# Patient Record
Sex: MALE | Race: WHITE | NOT HISPANIC OR LATINO | Employment: OTHER | URBAN - METROPOLITAN AREA
[De-identification: names, ages, dates, MRNs, and addresses within clinical notes are randomized per-mention and may not be internally consistent; named-entity substitution may affect disease eponyms.]

---

## 2023-03-28 ENCOUNTER — HOSPITAL ENCOUNTER (OUTPATIENT)
Dept: DATA CONVERSION | Facility: HOSPITAL | Age: 63
End: 2023-03-28
Attending: RADIOLOGY | Admitting: RADIOLOGY

## 2023-03-28 DIAGNOSIS — M89.9 DISORDER OF BONE, UNSPECIFIED: ICD-10-CM

## 2023-03-28 DIAGNOSIS — R59.0 LOCALIZED ENLARGED LYMPH NODES: ICD-10-CM

## 2023-03-28 LAB
ERYTHROCYTE DISTRIBUTION WIDTH (RATIO) BY AUTOMATED COUNT: 14.2 % (ref 11.5–14.5)
ERYTHROCYTE MEAN CORPUSCULAR HEMOGLOBIN CONCENTRATION (G/DL) BY AUTOMATED: 32 G/DL (ref 32–36)
ERYTHROCYTE MEAN CORPUSCULAR VOLUME (FL) BY AUTOMATED COUNT: 85 FL (ref 80–100)
ERYTHROCYTES (10*6/UL) IN BLOOD BY AUTOMATED COUNT: 5.07 X10E12/L (ref 4.5–5.9)
HEMATOCRIT (%) IN BLOOD BY AUTOMATED COUNT: 43.1 % (ref 41–52)
HEMOGLOBIN (G/DL) IN BLOOD: 13.8 G/DL (ref 13.5–17.5)
INR IN PPP BY COAGULATION ASSAY: 1 (ref 0.9–1.1)
LEUKOCYTES (10*3/UL) IN BLOOD BY AUTOMATED COUNT: 5.7 X10E9/L (ref 4.4–11.3)
NRBC (PER 100 WBCS) BY AUTOMATED COUNT: 0 /100 WBC (ref 0–0)
PLATELETS (10*3/UL) IN BLOOD AUTOMATED COUNT: 221 X10E9/L (ref 150–450)
PROTHROMBIN TIME (PT) IN PPP BY COAGULATION ASSAY: 11.2 SEC (ref 9.8–13.4)

## 2023-03-29 LAB
COMPLETE PATHOLOGY REPORT: NORMAL
CONVERTED CLINICAL DIAGNOSIS-HISTORY: NORMAL
CONVERTED DIAGNOSIS COMMENT: NORMAL
CONVERTED FINAL DIAGNOSIS: NORMAL
CONVERTED FINAL REPORT PDF LINK TO COPY AND PASTE: NORMAL
CONVERTED SPECIMEN DESCRIPTION: NORMAL

## 2023-09-07 VITALS — HEIGHT: 72 IN | BODY MASS INDEX: 33.23 KG/M2 | WEIGHT: 245.37 LBS

## 2023-10-04 ENCOUNTER — LAB REQUISITION (OUTPATIENT)
Dept: LAB | Facility: HOSPITAL | Age: 63
End: 2023-10-04

## 2023-10-05 ENCOUNTER — LAB REQUISITION (OUTPATIENT)
Dept: LAB | Facility: HOSPITAL | Age: 63
End: 2023-10-05
Payer: MEDICAID

## 2023-10-05 PROCEDURE — 88321 CONSLTJ&REPRT SLD PREP ELSWR: CPT | Performed by: PATHOLOGY

## 2023-10-05 PROCEDURE — 88321 CONSLTJ&REPRT SLD PREP ELSWR: CPT

## 2023-10-19 LAB
LAB AP ASR DISCLAIMER: NORMAL
PATH REPORT.FINAL DX SPEC: NORMAL
PATH REPORT.GROSS SPEC: NORMAL
PATH REPORT.MICROSCOPIC SPEC OTHER STN: NORMAL
PATH REPORT.RELEVANT HX SPEC: NORMAL
PATH REPORT.TOTAL CANCER: NORMAL

## 2024-05-09 ENCOUNTER — TUMOR BOARD CONFERENCE (OUTPATIENT)
Dept: HEMATOLOGY/ONCOLOGY | Facility: HOSPITAL | Age: 64
End: 2024-05-09
Payer: MEDICAID

## 2024-05-09 NOTE — TUMOR BOARD NOTE
TUMOR BOARD DISCUSSION SUMMARY    PRESENTER: Dr. Shelley Jay    DIAGNOSIS: ***    SUMMARY/PRESENTATION: Ariel Pink is a 64 y.o. male patient who presents with      History: ***    Previous treatment: ***    Information reviewed: {MDT Conference Review Type:43784}    Radiology: ***    Pathology: ***    STAGE (if available): ***    RECOMMENDATIONS: ***    CLINICAL TRIALS: ***      Disclaimer     SCC tumor board recommendations represent the consensus opinion of physicians present at a weekly patient care conference. The treating SCC physician is not always present, and many of the physicians formulating the recommendation have not personally seen or examined the patient under discussion. It is understood that the treating SCC physician considers the expertise of the Tumor Board Recommendation in formulating his/her plan for the patient. However, in many situations, based on individualized patient considerations, a different plan is determined by the treating physician to be the optimal medical management.     Scribe Attestation  By signing my name below, Elsa TURNER Scribe   attest that this documentation has been prepared under the direction and in the presence of MALIGNANT HEME TUMOR BOARD.

## 2024-05-14 ENCOUNTER — DOCUMENTATION (OUTPATIENT)
Dept: HEMATOLOGY/ONCOLOGY | Facility: HOSPITAL | Age: 64
End: 2024-05-14
Payer: MEDICAID

## 2024-05-14 NOTE — PROGRESS NOTES
5/14/24 1345  Patient is referred by Dr. Jay for DLBCL for which he is on PolaR+CHP x6 and upon completion, has residual uptake in left parotid and perirectal area on PET. He saw ENT and was to have parotid bx on 5/8/24. Awaiting results of bx. Patient is being presented at  on Thursday and I have a call out to him to schedule with [facility] for tx recs if refractory disease. CANDIDA Carmona    5/16/24 2750  Spoke with patient's wife yesterday. We thought patient was being presented at  this morning but he did not end up being presented. I have a call out to patient. I attempted to reach wife but she does not have VM set up. CANDIDA Carmona

## 2024-05-16 ENCOUNTER — TUMOR BOARD CONFERENCE (OUTPATIENT)
Dept: HEMATOLOGY/ONCOLOGY | Facility: HOSPITAL | Age: 64
End: 2024-05-16
Payer: MEDICAID

## 2024-05-16 NOTE — TUMOR BOARD NOTE
TUMOR BOARD DISCUSSION SUMMARY    PRESENTER: Dr. Shelley Jay    DIAGNOSIS: Diffuse Large B cell lymphoma.     SUMMARY/PRESENTATION: Ariel Pink is a 64 y.o. male patient who presents with diffuse large B-cell lymphoma, s/p 6 cycles RCHP and Polatuzumab, 2 cycles rituxan. Now with uptake in perirectal area.        Previous treatment: RCHP and Polatuzumab     Information reviewed: Radiology Review    Radiology: (05/01/24) PET CT: Persistent lymph node near the left parotid gland with interval decrease in FDG uptake. No other FDG avid adenopathy. Indeterminate focus of increased uptake at the rectum on the right.       RECOMMENDATIONS: Further assessment of rectal mass.              Disclaimer     SCC tumor board recommendations represent the consensus opinion of physicians present at a weekly patient care conference. The treating SCC physician is not always present, and many of the physicians formulating the recommendation have not personally seen or examined the patient under discussion. It is understood that the treating SCC physician considers the expertise of the Tumor Board Recommendation in formulating his/her plan for the patient. However, in many situations, based on individualized patient considerations, a different plan is determined by the treating physician to be the optimal medical management.     Scribe Attestation  By signing my name below, Elsa TURNER Scribe   attest that this documentation has been prepared under the direction and in the presence of MALIGNANT HEME TUMOR BOARD.

## 2024-05-17 DIAGNOSIS — C83.30 DIFFUSE LARGE B-CELL LYMPHOMA, UNSPECIFIED BODY REGION (MULTI): ICD-10-CM

## 2024-05-23 ENCOUNTER — OFFICE VISIT (OUTPATIENT)
Dept: HEMATOLOGY/ONCOLOGY | Facility: HOSPITAL | Age: 64
End: 2024-05-23
Payer: MEDICAID

## 2024-05-23 VITALS
OXYGEN SATURATION: 97 % | TEMPERATURE: 97 F | SYSTOLIC BLOOD PRESSURE: 131 MMHG | BODY MASS INDEX: 29.44 KG/M2 | WEIGHT: 210.32 LBS | HEART RATE: 64 BPM | DIASTOLIC BLOOD PRESSURE: 88 MMHG | HEIGHT: 71 IN | RESPIRATION RATE: 18 BRPM

## 2024-05-23 DIAGNOSIS — C83.30 DIFFUSE LARGE B-CELL LYMPHOMA, UNSPECIFIED BODY REGION (MULTI): ICD-10-CM

## 2024-05-23 PROCEDURE — 99215 OFFICE O/P EST HI 40 MIN: CPT | Performed by: INTERNAL MEDICINE

## 2024-05-23 PROCEDURE — 99205 OFFICE O/P NEW HI 60 MIN: CPT | Performed by: INTERNAL MEDICINE

## 2024-05-23 RX ORDER — GABAPENTIN 300 MG/1
300 CAPSULE ORAL 3 TIMES DAILY
COMMUNITY

## 2024-05-23 RX ORDER — HYDROCODONE BITARTRATE AND ACETAMINOPHEN 10; 300 MG/1; MG/1
1 TABLET ORAL EVERY 6 HOURS PRN
COMMUNITY

## 2024-05-23 RX ORDER — AMLODIPINE BESYLATE AND ATORVASTATIN CALCIUM 5; 10 MG/1; MG/1
1 TABLET, FILM COATED ORAL DAILY
COMMUNITY

## 2024-05-23 ASSESSMENT — PAIN SCALES - GENERAL: PAINLEVEL: 8

## 2024-06-04 ASSESSMENT — ENCOUNTER SYMPTOMS
GASTROINTESTINAL NEGATIVE: 1
NUMBNESS: 1
ENDOCRINE NEGATIVE: 1
CARDIOVASCULAR NEGATIVE: 1
HEMATOLOGIC/LYMPHATIC NEGATIVE: 1
EYES NEGATIVE: 1
CONSTITUTIONAL NEGATIVE: 1
RESPIRATORY NEGATIVE: 1
BACK PAIN: 1
PSYCHIATRIC NEGATIVE: 1

## 2024-06-04 NOTE — PROGRESS NOTES
Patient ID: Ariel Pink is a 64 y.o. male.  Referring Physician: Shelley Jay MD  [address] [id number]  Primary Care Provider: Bib Castillo MD      Subjective    The patient is currently under the care of Dr. Shelley Jay of [facility] for recently diagnosed DLBCL in 2024.     Regarding DLBCL, he initially presented with left sided pleuritic chest pain in 10/2022, resulting in incidental finding of RPLAD and L5 lytic lesion. In Nov 2022 PET/CT showed multiple mildly-moderately FDG avid LAD. More work up was done, including IR bx of a RPLN in 3/2023, but not diagnostic of a cancer. He presented with new worsening back pain in 8/2023, followed by fall and pathological fractures of the spine. During surgery on 8/11/2023 for fusion of T12-L1, L3-L4, biopsy was also performed leading to DLBCL, with positive BCL6 and MUM1 staining, and non-GCB subtype. KI67 was 70-80%, and the specimen was not useful for FISH. His IPI was 3 (age, extranodal sites, high LDH). He was therefore given DEBORAH-R-CHP (10/18/2023--) in addition to palliative XRT of the involved spine area. After 3 cycles he was found to have achieved a CMR by PET/CT. Of note, the left parotid gland was mildly FDG avid and biopsy proven Warthin tumor. Unfortunately, he also had a recurrent PE and was on Eliquis. He then received 3 more deborah-RCHP, with c6 completed on 2/1/2024. After c5 his PET/CT again confirmed CMR of the ruddy and extranodal lymphoma. Again, the left parotid gland was mildly FDG avid and biopsy proven Warthin tumor.     On May 1, 2024, another PET/CT was repeated, showing LN of the left neck with SUV 4.8, and rectal wall uptake of SUV 7. These results raise the question of relapse. He is referred for further management.     Today he has chronic back pain, chronic, and chronic numbness in the back and thigh. No fever wt loss night sweats. No rectal bleeding or pain.           Review of  "Systems   Constitutional: Negative.    HENT:  Negative.     Eyes: Negative.    Respiratory: Negative.     Cardiovascular: Negative.    Gastrointestinal: Negative.    Endocrine: Negative.    Genitourinary: Negative.     Musculoskeletal:  Positive for back pain.   Skin: Negative.    Neurological:  Positive for numbness.   Hematological: Negative.    Psychiatric/Behavioral: Negative.          Objective   BSA: 2.19 meters squared  /88 (BP Location: Right arm, Patient Position: Sitting)   Pulse 64   Temp 36.1 °C (97 °F) (Temporal)   Resp 18   Ht (S) 1.81 m (5' 11.26\")   Wt 95.4 kg (210 lb 5.1 oz)   SpO2 97%   BMI 29.12 kg/m²     No family history on file.  Oncology History    No history exists.       Ariel Pink  reports that he has quit smoking. His smoking use included cigarettes. He started smoking about 49 years ago. He has a 70.5 pack-year smoking history. He has never used smokeless tobacco.  He  reports no history of alcohol use.  He  reports no history of drug use.    Physical Exam  Musculoskeletal:      Comments: 30cm surgical scar in the lower back.          Performance Status:  Asymptomatic    Assessment/Plan    #DLBCL  -Initial dx 8/11/2023. Non-GCB.   -Reviewed all results from OSH. Results are consistent with stage IV (ruddy plus extranodal-bone involvement), with an IPI score of 4 (age, stage, extranodal sites, high LDH).   -Misha-RCHP x 3 resulted in CMR. Misha-R-CHP x 5 again confirmed CMR.   -EOT (Misha-R-CHP X 6, Ritux x 2) PET/CT in May 2024: new FDG avid signals are reported: There is redemonstration of a lymph node with increased FDG uptake at the left upper neck superficial to the sternocleidomastoid along the posterior inferior margin of the parotid gland. The current standard uptake value is 4.8 previously 8.2. There is a focal area of increased uptake along the wall of the rectum on the right with SUV of 7.  -The patient has no symptoms related to the above 2 sites of FDG avid " signals. It is premature to consider those as lymphoma relapse. Colonoscopy or sigmoidoscopy is recommended to evaluate the rectum within the next 3-4 weeks, and repeat CT neck q6 w x 2 may also be considered. If the above studies do not raise further suspicion or evidence of relapse, he can be followed with imaging q6 mon. Due to extranodal involvement, PET/CT is preferred.   -If relapse is confirmed in the future there are many treatment options. The patient is reassured.   -One interesting aspect of his disease: pre-chemo PET/CT in 9/2023 reported only low SUV of the lymph nodes and bony lesions in different regions. Such low SUV is more typically seen in indolent lymphomas.     Plan:  -Colonoscopy or sigmoidoscopy is recommended to evaluate the rectum within the next 3-4 weeks.   -Repeat CT neck q6 w x 2 may also be considered.   -If the above studies do not raise further suspicion or evidence of relapse, he can be followed with imaging q6 mon. Due to extranodal involvement, PET/CT is preferred.   -[facility] He will be welcome to return for further discussion if needed.     Time spent > 65 min, with more than 50% on counseling and coordinating care.             Jose J Chang MD PhD

## 2024-06-04 NOTE — H&P (VIEW-ONLY)
Patient ID: Ariel Pink is a 64 y.o. male.  Referring Physician: Shelley Jay MD  [address] [phone]  Primary Care Provider: Bib Castillo MD      Subjective    The patient is currently under the care of Dr. Shelley Jay of [facility] for recently diagnosed DLBCL in 2024.     Regarding DLBCL, he initially presented with left sided pleuritic chest pain in 10/2022, resulting in incidental finding of RPLAD and L5 lytic lesion. In Nov 2022 PET/CT showed multiple mildly-moderately FDG avid LAD. More work up was done, including IR bx of a RPLN in 3/2023, but not diagnostic of a cancer. He presented with new worsening back pain in 8/2023, followed by fall and pathological fractures of the spine. During surgery on 8/11/2023 for fusion of T12-L1, L3-L4, biopsy was also performed leading to DLBCL, with positive BCL6 and MUM1 staining, and non-GCB subtype. KI67 was 70-80%, and the specimen was not useful for FISH. His IPI was 3 (age, extranodal sites, high LDH). He was therefore given DEBORAH-R-CHP (10/18/2023--) in addition to palliative XRT of the involved spine area. After 3 cycles he was found to have achieved a CMR by PET/CT. Of note, the left parotid gland was mildly FDG avid and biopsy proven Warthin tumor. Unfortunately, he also had a recurrent PE and was on Eliquis. He then received 3 more deborah-RCHP, with c6 completed on 2/1/2024. After c5 his PET/CT again confirmed CMR of the ruddy and extranodal lymphoma. Again, the left parotid gland was mildly FDG avid and biopsy proven Warthin tumor.     On May 1, 2024, another PET/CT was repeated, showing LN of the left neck with SUV 4.8, and rectal wall uptake of SUV 7. These results raise the question of relapse. He is referred for further management.     Today he has chronic back pain, chronic, and chronic numbness in the back and thigh. No fever wt loss night sweats. No rectal bleeding or pain.           Review of  "Systems   Constitutional: Negative.    HENT:  Negative.     Eyes: Negative.    Respiratory: Negative.     Cardiovascular: Negative.    Gastrointestinal: Negative.    Endocrine: Negative.    Genitourinary: Negative.     Musculoskeletal:  Positive for back pain.   Skin: Negative.    Neurological:  Positive for numbness.   Hematological: Negative.    Psychiatric/Behavioral: Negative.          Objective   BSA: 2.19 meters squared  /88 (BP Location: Right arm, Patient Position: Sitting)   Pulse 64   Temp 36.1 °C (97 °F) (Temporal)   Resp 18   Ht (S) 1.81 m (5' 11.26\")   Wt 95.4 kg (210 lb 5.1 oz)   SpO2 97%   BMI 29.12 kg/m²     No family history on file.  Oncology History    No history exists.       Ariel Pink  reports that he has quit smoking. His smoking use included cigarettes. He started smoking about 49 years ago. He has a 70.5 pack-year smoking history. He has never used smokeless tobacco.  He  reports no history of alcohol use.  He  reports no history of drug use.    Physical Exam  Musculoskeletal:      Comments: 30cm surgical scar in the lower back.          Performance Status:  Asymptomatic    Assessment/Plan    #DLBCL  -Initial dx 8/11/2023. Non-GCB.   -Reviewed all results from OSH. Results are consistent with stage IV (ruddy plus extranodal-bone involvement), with an IPI score of 4 (age, stage, extranodal sites, high LDH).   -Misha-RCHP x 3 resulted in CMR. Misha-R-CHP x 5 again confirmed CMR.   -EOT (Misha-R-CHP X 6, Ritux x 2) PET/CT in May 2024: new FDG avid signals are reported: There is redemonstration of a lymph node with increased FDG uptake at the left upper neck superficial to the sternocleidomastoid along the posterior inferior margin of the parotid gland. The current standard uptake value is 4.8 previously 8.2. There is a focal area of increased uptake along the wall of the rectum on the right with SUV of 7.  -The patient has no symptoms related to the above 2 sites of FDG avid " signals. It is premature to consider those as lymphoma relapse. Colonoscopy or sigmoidoscopy is recommended to evaluate the rectum within the next 3-4 weeks, and repeat CT neck q6 w x 2 may also be considered. If the above studies do not raise further suspicion or evidence of relapse, he can be followed with imaging q6 mon. Due to extranodal involvement, PET/CT is preferred.   -If relapse is confirmed in the future there are many treatment options. The patient is reassured.   -One interesting aspect of his disease: pre-chemo PET/CT in 9/2023 reported only low SUV of the lymph nodes and bony lesions in different regions. Such low SUV is more typically seen in indolent lymphomas.     Plan:  -Colonoscopy or sigmoidoscopy is recommended to evaluate the rectum within the next 3-4 weeks.   -Repeat CT neck q6 w x 2 may also be considered.   -If the above studies do not raise further suspicion or evidence of relapse, he can be followed with imaging q6 mon. Due to extranodal involvement, PET/CT is preferred.   -Patient will follow up with Dr. Jay of [facility]. He will be welcome to return for further discussion if needed.     Time spent > 65 min, with more than 50% on counseling and coordinating care.             Jose J Chang MD PhD

## 2024-06-11 ENCOUNTER — DOCUMENTATION (OUTPATIENT)
Dept: GASTROENTEROLOGY | Facility: HOSPITAL | Age: 64
End: 2024-06-11
Payer: MEDICAID

## 2024-06-11 NOTE — PROGRESS NOTES
Dr. Bonds's office received a referral from Dr. Shelley Jay for this patient to undergo a colonoscopy or sigmoidoscopy, recommended to evaluate the rectum  Dr. Bonds reviewed the referral on 6/11/2024. Per Dr. Bonds, OK to schedule colonoscopy.    Gi Bailon was notified to call and schedule this patient. Contact Yoli Sewell RN at [phone] for any questions.      See below for supporting clinical information from the referral sent by Dr. Jay's office and from medical records:    -Problem(s): Diffuse large B-cell lymphoma of extranodal site    -5/9/2024: Due to residual abnormalities on PET/CT, the patient refers to referral to  Malignant Hematology team for consideration of CAR-T therapy. Set up consultation with Dr. Cnidy Santiago malignant hematology in [place] for CAR-T evaluation. If sampling of a perirectal mass is needed this would likely have to occur at a tertiary center under endoscopic ultrasound    5/2/2024: PET/CT imaging and reports that show no residual uptake in the lumbar spine, but there are persistent areas of FDG avidity in the left parotid gland and perirectal area. Recommended presenting his case to  malignant hematology tumor board next week for recommendations (possible repeat colonoscopy/biopsy, salvage chemotherapy +/- autologous transplant evaluation).       Jose J Chang MD PhD  Hematology and Oncology Diffuse large B-cell lymphoma, unspecified body region (Multi)  Dx Referred by Shelley Jay MD  Reason for Visit     Progress Notes  Jose J Chang MD PhD (Physician)  Oncology  Expand All Collapse All  Patient ID: Ariel Pink is a 64 y.o. male.  Referring Physician: Shelley Jay MD  [address] [place] [id number]  Primary Care Provider: Bib Castillo MD         Subjective   The patient is currently under the care of Dr. Shelley Jay of [facility] for recently diagnosed DLBCL in 2024.      Regarding DLBCL, he  initially presented with left sided pleuritic chest pain in 10/2022, resulting in incidental finding of RPLAD and L5 lytic lesion. In Nov 2022 PET/CT showed multiple mildly-moderately FDG avid LAD. More work up was done, including IR bx of a RPLN in 3/2023, but not diagnostic of a cancer. He presented with new worsening back pain in 8/2023, followed by fall and pathological fractures of the spine. During surgery on 8/11/2023 for fusion of T12-L1, L3-L4, biopsy was also performed leading to DLBCL, with positive BCL6 and MUM1 staining, and non-GCB subtype. KI67 was 70-80%, and the specimen was not useful for FISH. His IPI was 3 (age, extranodal sites, high LDH). He was therefore given DANTE-R-CHP (10/18/2023--) in addition to palliative XRT of the involved spine area. After 3 cycles he was found to have achieved a CMR by PET/CT. Of note, the left parotid gland was mildly FDG avid and biopsy proven Warthin tumor. Unfortunately, he also had a recurrent PE and was on Eliquis. He then received 3 more dante-RCHP, with c6 completed on 2/1/2024. After c5 his PET/CT again confirmed CMR of the ruddy and extranodal lymphoma. Again, the left parotid gland was mildly FDG avid and biopsy proven Warthin tumor.      On May 1, 2024, another PET/CT was repeated, showing LN of the left neck with SUV 4.8, and rectal wall uptake of SUV 7. These results raise the question of relapse. He is referred for further management.      Today he has chronic back pain, chronic, and chronic numbness in the back and thigh. No fever wt loss night sweats. No rectal bleeding or pain.        Ariel Pink  reports that he has quit smoking. His smoking use included cigarettes. He started smoking about 49 years ago. He has a 70.5 pack-year smoking history. He has never used smokeless tobacco.  He  reports no history of alcohol use.  He  reports no history of drug use.       Assessment/Plan   #DLBCL  -Initial dx 8/11/2023. Non-GCB.   -Reviewed all results  from OS. Results are consistent with stage IV (ruddy plus extranodal-bone involvement), with an IPI score of 4 (age, stage, extranodal sites, high LDH).   -Misha-RCHP x 3 resulted in CMR. Misha-R-CHP x 5 again confirmed CMR.   -EOT (Misha-R-CHP X 6, Ritux x 2) PET/CT in May 2024: new FDG avid signals are reported: There is redemonstration of a lymph node with increased FDG uptake at the left upper neck superficial to the sternocleidomastoid along the posterior inferior margin of the parotid gland. The current standard uptake value is 4.8 previously 8.2. There is a focal area of increased uptake along the wall of the rectum on the right with SUV of 7.  -The patient has no symptoms related to the above 2 sites of FDG avid signals. It is premature to consider those as lymphoma relapse. Colonoscopy or sigmoidoscopy is recommended to evaluate the rectum within the next 3-4 weeks, and repeat CT neck q6 w x 2 may also be considered. If the above studies do not raise further suspicion or evidence of relapse, he can be followed with imaging q6 mon. Due to extranodal involvement, PET/CT is preferred.   -If relapse is confirmed in the future there are many treatment options. The patient is reassured.   -One interesting aspect of his disease: pre-chemo PET/CT in 9/2023 reported only low SUV of the lymph nodes and bony lesions in different regions. Such low SUV is more typically seen in indolent lymphomas.      Plan:  -Colonoscopy or sigmoidoscopy is recommended to evaluate the rectum within the next 3-4 weeks.   -Repeat CT neck q6 w x 2 may also be considered.   -If the above studies do not raise further suspicion or evidence of relapse, he can be followed with imaging q6 mon. Due to extranodal involvement, PET/CT is preferred.   -Patient will follow up with Dr. Jay of [facility]. He will be welcome to return for further discussion if needed.

## 2024-06-19 ENCOUNTER — HOSPITAL ENCOUNTER (OUTPATIENT)
Dept: GASTROENTEROLOGY | Facility: HOSPITAL | Age: 64
Setting detail: OUTPATIENT SURGERY
Discharge: HOME | End: 2024-06-19
Payer: MEDICAID

## 2024-06-19 ENCOUNTER — ANESTHESIA EVENT (OUTPATIENT)
Dept: GASTROENTEROLOGY | Facility: HOSPITAL | Age: 64
End: 2024-06-19
Payer: MEDICAID

## 2024-06-19 ENCOUNTER — ANESTHESIA (OUTPATIENT)
Dept: GASTROENTEROLOGY | Facility: HOSPITAL | Age: 64
End: 2024-06-19
Payer: MEDICAID

## 2024-06-19 VITALS
OXYGEN SATURATION: 96 % | SYSTOLIC BLOOD PRESSURE: 124 MMHG | RESPIRATION RATE: 16 BRPM | HEIGHT: 72 IN | HEART RATE: 96 BPM | BODY MASS INDEX: 28.31 KG/M2 | WEIGHT: 209 LBS | DIASTOLIC BLOOD PRESSURE: 68 MMHG | TEMPERATURE: 96.8 F

## 2024-06-19 DIAGNOSIS — C83.39 DIFFUSE LARGE B-CELL LYMPHOMA OF EXTRANODAL SITE (MULTI): Primary | ICD-10-CM

## 2024-06-19 DIAGNOSIS — C85.90 LYMPHOMA (MULTI): ICD-10-CM

## 2024-06-19 DIAGNOSIS — R93.89 ABNORMAL CT SCAN: ICD-10-CM

## 2024-06-19 DIAGNOSIS — R94.8 ABNORMAL POSITRON EMISSION TOMOGRAPHY (PET) SCAN: ICD-10-CM

## 2024-06-19 PROCEDURE — 7100000010 HC PHASE TWO TIME - EACH INCREMENTAL 1 MINUTE

## 2024-06-19 PROCEDURE — 76975 GI ENDOSCOPIC ULTRASOUND: CPT | Performed by: INTERNAL MEDICINE

## 2024-06-19 PROCEDURE — 45390 COLONOSCOPY W/RESECTION: CPT | Performed by: INTERNAL MEDICINE

## 2024-06-19 PROCEDURE — 2500000004 HC RX 250 GENERAL PHARMACY W/ HCPCS (ALT 636 FOR OP/ED): Performed by: INTERNAL MEDICINE

## 2024-06-19 PROCEDURE — 3700000001 HC GENERAL ANESTHESIA TIME - INITIAL BASE CHARGE

## 2024-06-19 PROCEDURE — 2500000004 HC RX 250 GENERAL PHARMACY W/ HCPCS (ALT 636 FOR OP/ED): Performed by: ANESTHESIOLOGIST ASSISTANT

## 2024-06-19 PROCEDURE — 7100000009 HC PHASE TWO TIME - INITIAL BASE CHARGE

## 2024-06-19 PROCEDURE — 2720000007 HC OR 272 NO HCPCS

## 2024-06-19 PROCEDURE — 3700000002 HC GENERAL ANESTHESIA TIME - EACH INCREMENTAL 1 MINUTE

## 2024-06-19 RX ORDER — PROPOFOL 10 MG/ML
INJECTION, EMULSION INTRAVENOUS AS NEEDED
Status: DISCONTINUED | OUTPATIENT
Start: 2024-06-19 | End: 2024-06-19

## 2024-06-19 RX ORDER — SODIUM CHLORIDE, SODIUM LACTATE, POTASSIUM CHLORIDE, CALCIUM CHLORIDE 600; 310; 30; 20 MG/100ML; MG/100ML; MG/100ML; MG/100ML
20 INJECTION, SOLUTION INTRAVENOUS CONTINUOUS
Status: DISCONTINUED | OUTPATIENT
Start: 2024-06-19 | End: 2024-06-20 | Stop reason: HOSPADM

## 2024-06-19 RX ORDER — SODIUM CHLORIDE, SODIUM LACTATE, POTASSIUM CHLORIDE, CALCIUM CHLORIDE 600; 310; 30; 20 MG/100ML; MG/100ML; MG/100ML; MG/100ML
100 INJECTION, SOLUTION INTRAVENOUS CONTINUOUS
OUTPATIENT
Start: 2024-06-19

## 2024-06-19 RX ORDER — ONDANSETRON HYDROCHLORIDE 2 MG/ML
4 INJECTION, SOLUTION INTRAVENOUS ONCE AS NEEDED
OUTPATIENT
Start: 2024-06-19

## 2024-06-19 RX ORDER — LIDOCAINE HYDROCHLORIDE 10 MG/ML
0.1 INJECTION INFILTRATION; PERINEURAL ONCE
OUTPATIENT
Start: 2024-06-19 | End: 2024-06-19

## 2024-06-19 RX ORDER — HEPARIN 100 UNIT/ML
1 SYRINGE INTRAVENOUS AS NEEDED
Status: DISCONTINUED | OUTPATIENT
Start: 2024-06-19 | End: 2024-06-20 | Stop reason: HOSPADM

## 2024-06-19 SDOH — HEALTH STABILITY: MENTAL HEALTH: CURRENT SMOKER: 0

## 2024-06-19 ASSESSMENT — PAIN - FUNCTIONAL ASSESSMENT
PAIN_FUNCTIONAL_ASSESSMENT: 0-10

## 2024-06-19 ASSESSMENT — COLUMBIA-SUICIDE SEVERITY RATING SCALE - C-SSRS
1. IN THE PAST MONTH, HAVE YOU WISHED YOU WERE DEAD OR WISHED YOU COULD GO TO SLEEP AND NOT WAKE UP?: NO
6. HAVE YOU EVER DONE ANYTHING, STARTED TO DO ANYTHING, OR PREPARED TO DO ANYTHING TO END YOUR LIFE?: NO
2. HAVE YOU ACTUALLY HAD ANY THOUGHTS OF KILLING YOURSELF?: NO

## 2024-06-19 ASSESSMENT — PAIN SCALES - GENERAL
PAINLEVEL_OUTOF10: 0 - NO PAIN
PAINLEVEL_OUTOF10: 0 - NO PAIN
PAINLEVEL_OUTOF10: 9
PAIN_LEVEL: 0

## 2024-06-19 NOTE — ANESTHESIA POSTPROCEDURE EVALUATION
Patient: Ariel Pink    Procedure Summary       Date: 06/19/24 Room / Location: [facility]    Anesthesia Start: 1142 Anesthesia Stop: 1233    Procedures:       COLONOSCOPY      ENDOSCOPIC ULTRASOUND (LOWER) Diagnosis:       Diffuse large B-cell lymphoma of extranodal site (Multi)      Abnormal CT scan      Abnormal positron emission tomography (PET) scan      Lymphoma (Multi)      Diffuse large B-cell lymphoma of extranodal site (Multi)    Scheduled Providers: Colin Bonds MD Responsible Provider: Anthony Iqbal MD    Anesthesia Type: MAC ASA Status: 3            Anesthesia Type: MAC    Vitals Value Taken Time   BP 97/65 06/19/24 1233   Temp 36.2 06/19/24 1233   Pulse 78 06/19/24 1233   Resp 13 06/19/24 1233   SpO2 96 06/19/24 1233       Anesthesia Post Evaluation    Patient location during evaluation: PACU  Patient participation: complete - patient cannot participate  Level of consciousness: awake  Pain score: 0  Pain management: adequate  There was medical reason for not using a multimodal analgesia pain management approach.Airway patency: patent  Two or more strategies used to mitigate risk of obstructive sleep apnea  Cardiovascular status: acceptable and stable  Respiratory status: acceptable and room air  Hydration status: acceptable  Postoperative Nausea and Vomiting: none        No notable events documented.

## 2024-06-19 NOTE — ANESTHESIA PREPROCEDURE EVALUATION
Patient: Ariel Pink    Procedure Information       Date/Time: 06/19/24 1300    Scheduled providers: Colin Bonds MD    Procedures:       COLONOSCOPY      ENDOSCOPIC ULTRASOUND (LOWER)    [place] [facility] [place]            Relevant Problems   Anesthesia (within normal limits)      Cardiac (within normal limits)      Pulmonary  Former smoker    PE 2 years ok on AC, off for procedure      Neuro (within normal limits)      GI (within normal limits)      Hematology  Cancer undergoing treatment       Clinical information reviewed:                   NPO Detail:  No data recorded     Physical Exam    Airway  Mallampati: III  TM distance: >3 FB  Neck ROM: full     Cardiovascular   Rhythm: regular  Rate: normal     Dental   (+) upper dentures     Pulmonary   Breath sounds clear to auscultation     Abdominal            Anesthesia Plan    History of general anesthesia?: yes  History of complications of general anesthesia?: no    ASA 3     MAC     The patient is not a current smoker.    intravenous induction   Anesthetic plan and risks discussed with patient.  Use of blood products discussed with patient who.

## 2024-06-19 NOTE — DISCHARGE INSTRUCTIONS
Patient Instructions after a Colonoscopy      The anesthetics, sedatives or narcotics which were given to you today will be acting in your body for the next 24 hours, so you might feel a little sleepy or groggy.  This feeling should slowly wear off. Carefully read and follow the instructions.     You received sedation today:  - Do not drive or operate any machinery or power tools of any kind.   - No alcoholic beverages today, not even beer or wine.  - Do not make any important decisions or sign any legal documents.  - No over the counter medications that contain alcohol or that may cause drowsiness.  - Do not make any important decisions or sign any legal documents.  - Make sure you have someone with you for first 24 hours.    While it is common to experience mild to moderate abdominal distention, gas, or belching after your procedure, if any of these symptoms occur following discharge from the GI Lab or within one week of having your procedure, call the [facility] to be advised whether a visit to your nearest Urgent Care or Emergency Department is indicated.  Take this paper with you if you go.     - If you develop an allergic reaction to the medications that were given during your procedure such as difficulty breathing, rash, hives, severe nausea, vomiting or lightheadedness.  - If you experience chest pain, shortness of breath, severe abdominal pain, fevers and chills.  -If you develop signs and symptoms of bleeding such as blood in your spit, if your stools turn black, tarry, or bloody  - If you have not urinated within 8 hours following your procedure.  - If your IV site becomes painful, red, inflamed, or looks infected.    If you received a biopsy/polypectomy/sphincterotomy the following instructions apply below:    __ Do not use Aspirin containing products, non-steroidal medications or anti-coagulants for one week following your procedure. (Examples of these types of medications are: Advil,  Arthrotec, Aleve, Coumadin, Ecotrin, Heparin, Ibuprofen, Indocin, Motrin, Naprosyn, Nuprin, Plavix, Vioxx, and Voltarin, or their generic forms.  This list is not all-inclusive.  Check with your physician or pharmacist before resuming medications.)   __ Eat a soft diet today.  Avoid foods that are poorly digested for the next 24 hours.  These foods would include: nuts, beans, lettuce, red meats, and fried foods. Start with liquids and advance your diet as tolerated, gradually work up to eating solids.   __ Do not have a Barium Study or Enema for one week.    Your physician recommends the additional following instructions:    -You have a contact number available for emergencies. The signs and symptoms of potential delayed complications were discussed with you. You may return to normal activities tomorrow.  -Resume your previous diet.  -Continue your present medications.   -We are waiting for your pathology results.  -Your physician has recommended a repeat colonoscopy (date to be determined after pending pathology results are reviewed) for surveillance based on pathology results.  -The findings and recommendations have been discussed with you.  -The findings and recommendations were discussed with your family.  - Please see Medication Reconciliation Form for new medication/medications prescribed.       If you experience any problems or have any questions following discharge from the GI Lab, please call:        Nurse Signature                                                                        Date___________________                                                                            Patient/Responsible Party Signature                                        Date___________________

## 2024-06-26 ENCOUNTER — APPOINTMENT (OUTPATIENT)
Dept: OTOLARYNGOLOGY | Facility: CLINIC | Age: 64
End: 2024-06-26
Payer: MEDICAID

## 2024-06-26 DIAGNOSIS — K11.8 PAROTID MASS: ICD-10-CM

## 2024-06-26 LAB
LABORATORY COMMENT REPORT: NORMAL
PATH REPORT.FINAL DX SPEC: NORMAL
PATH REPORT.GROSS SPEC: NORMAL
PATH REPORT.RELEVANT HX SPEC: NORMAL
PATH REPORT.TOTAL CANCER: NORMAL

## 2024-06-26 PROCEDURE — 1036F TOBACCO NON-USER: CPT | Performed by: OTOLARYNGOLOGY

## 2024-06-26 PROCEDURE — 99204 OFFICE O/P NEW MOD 45 MIN: CPT | Performed by: OTOLARYNGOLOGY

## 2024-06-26 NOTE — PROGRESS NOTES
ENT Outpatient Consultation    Chief Complaint: Left parotid mass  History Of Present Illness  Ariel Pink is a 64 y.o. male presents for evaluation of a left-sided parotid mass.  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  He has had follow-up PET scans that have showed this mass and he is now interested in removal.  He occasionally has some irritation.  He has not had a recent CT scan     Past Medical History  He has no past medical history on file.  Lymphoma  Surgical History  He has a past surgical history that includes CT guided percutaneous biopsy LYMPH node superficial (3/29/2023).     Social History  He reports that he has quit smoking. His smoking use included cigarettes. He started smoking about 49 years ago. He has a 70.5 pack-year smoking history. He has never used smokeless tobacco. He reports that he does not drink alcohol and does not use drugs.    Family History  No family history on file.     Allergies  Patient has no known allergies.     Physical Exam:  CONSTITUTIONAL:  No acute distress  VOICE:  No hoarseness or other abnormality  RESPIRATION:  Breathing comfortably, no stridor  CV:  No clubbing/cyanosis/edema in hands  EYES:  EOM intact, sclera normal  NEURO:  Alert and oriented times 3, Cranial nerves II-XII grossly intact and symmetric bilaterally  HEAD AND FACE:  Symmetric facial features, no masses or lesions, sinuses non-tender to palpation  SALIVARY GLANDS: Palpable mass in the tail of the left parotid  EARS:  Normal external ears, external auditory canals, and TMs to otoscopy, normal hearing to whispered voice.  NOSE:  External nose midline, anterior rhinoscopy is normal with limited visualization to the anterior aspect of the interior turbinates, no bleeding or drainage, no lesions  ORAL CAVITY/OROPHARYNX/LIPS:  Normal mucous membranes, normal floor of mouth/tongue/OP, no masses or lesions  PHARYNGEAL WALLS:  No  masses or lesions  NECK/LYMPH:  No LAD, no thyroid masses, trachea midline  SKIN:  Neck skin is without scar or injury  PSYCH:  Alert and oriented with appropriate mood and affect     Last Recorded Vitals  There were no vitals taken for this visit.    Relevant Results  Colonoscopy Diagnostic    Result Date: 6/19/2024  Table formatting from the original result was not included. Impression One 20 mm polyp in the rectum; removed by EMR, post-procedure bleeding was visualized; placed 1 clip successfully; hemostasis achieved The ileocecal valve, cecum, ascending colon, hepatic flexure, transverse colon, splenic flexure, descending colon and sigmoid colon appeared normal. Findings One 20 mm sessile polyp in the rectum; completely removed target lesion en bloc by EMR and retrieved specimen. Clear-cut demarcation of the lesion was performed prior to procedure. EMR was performed with a hot snare. Post-procedure bleeding was visualized; placed 1 clip successfully (clip is MRI conditional); hemostasis achieved The ileocecal valve, cecum, ascending colon, hepatic flexure, transverse colon, splenic flexure, descending colon and sigmoid colon appeared normal.;  Recommendation  Await pathology results  Repeat colonoscopy in 5 years Indication Abnormal CT scan, Diffuse large B-cell lymphoma of extranodal site (Multi), Abnormal positron emission tomography (PET) scan Staff Staff Role Cloin Bonds MD Proceduralist Medications See Anesthesia Record. Preprocedure A history and physical has been performed, and patient medication allergies have been reviewed. The patient's tolerance of previous anesthesia has been reviewed. The risks and benefits of the procedure and the sedation options and risks were discussed with the patient. All questions were answered and informed consent obtained. Details of the Procedure The patient underwent monitored anesthesia care, which was administered by an anesthesia professional. The patient's blood  pressure, ECG, ETCO2, heart rate, level of consciousness, oxygen and respirations were monitored throughout the procedure. A digital rectal exam was performed. A perianal exam was performed. The scope was introduced through the anus and advanced to the cecum. The quality of bowel preparation was evaluated using the Broad Top Bowel Preparation Scale with scores of: right colon = 2, transverse colon = 3, left colon = 2. The total BBPS score was 7. Bowel prep was adequate. The patient's estimated blood loss was minimal (<5 mL). The procedure was not difficult. The patient tolerated the procedure well. There were no apparent adverse events. Events Procedure Events Event Event Time ENDO SCOPE IN TIME 6/19/2024 11:48 AM ENDO SCOPE OUT TIME 6/19/2024 11:51 AM ENDO SCOPE IN TIME 6/19/2024 11:52 AM ENDO SCOPE IN TIME 6/19/2024 11:53 AM ENDO CECUM REACHED 6/19/2024 12:18 PM ENDO SCOPE OUT TIME 6/19/2024 12:21 PM Specimens ID Type Source Tests Collected by Time 1 : POLYP Tissue RECTUM ENDOSCOPIC MUCOSAL RESECTION SURGICAL PATHOLOGY EXAM [facility] 6/19/2024 1209 2 : POST POLYPECTOMY EDGE BX Tissue RECTUM BIOPSY SURGICAL PATHOLOGY EXAM [facility] 6/19/2024 1210 Procedure Location [facility] [address] [facility] [id number] [phone] Referring Provider Shelley Jay MD Procedure Provider MD Shelley Acosta     Endoscopic Ultrasound (Lower)    Result Date: 6/19/2024  Table formatting from the original result was not included. Impression One 20 mm polyp in the distal rectum Localized wall thickening in the rectum, involving mucosa Findings One 20 mm sessile polyp in the distal rectum Localized wall thickening measuring 15 mm in the rectum 10 cm from the anal verge, involving mucosa. There was 15mm x 10 mm hypoechoic lesion arising from mucosa and limited to mucosa, was noted in rectum,  10 cm from anal verge. Recommendation  Proceed to colonoscopy  Indication Lymphoma  (Multi) Staff Staff Role Colin Bonds MD Proceduralist Medications See Anesthesia Record. Preprocedure A history and physical has been performed, and patient medication allergies have been reviewed. The patient's tolerance of previous anesthesia has been reviewed. The risks and benefits of the procedure and the sedation options and risks were discussed with the patient. All questions were answered and informed consent obtained. Details of the Procedure The patient underwent monitored anesthesia care, which was administered by an anesthesia professional. The patient's blood pressure, heart rate, level of consciousness, oxygen, respirations, ECG and ETCO2 were monitored throughout the procedure. A digital rectal exam was performed. The scope was advanced to the rectosigmoid. Retroflexion was performed in the rectum. The patient's estimated blood loss was minimal (<5 mL). The procedure was not difficult. The patient tolerated the procedure well. There were no apparent adverse events. Events Procedure Events Event Event Time ENDO SCOPE IN TIME 6/19/2024 11:48 AM ENDO SCOPE OUT TIME 6/19/2024 11:51 AM ENDO SCOPE IN TIME 6/19/2024 11:52 AM ENDO SCOPE IN TIME 6/19/2024 11:53 AM ENDO CECUM REACHED 6/19/2024 12:18 PM ENDO SCOPE OUT TIME 6/19/2024 12:21 PM Specimens ID Type Source Tests Collected by Time 1 : POLYP Tissue RECTUM ENDOSCOPIC MUCOSAL RESECTION SURGICAL PATHOLOGY EXAM [facility] 6/19/2024 1209 2 : POST POLYPECTOMY EDGE BX Tissue RECTUM BIOPSY SURGICAL PATHOLOGY EXAM [facility] 6/19/2024 1210 Procedure Location [facility] [address] [facility] [id number] [phone] Referring Provider Shelley Jay MD Procedure Provider Colin Bonds MD       Assessment and Plan  64 y.o. male with Warthin's tumor of the left parotid.  We discussed parotidectomy.  Discussed risks of bleeding, infection, numbness, cranial neuropathies.  We discussed possible drain placement.  We  will get a CT scan of the neck with contrast to evaluate prior to surgery.  All questions were answered    Alexi Donis MD

## 2024-07-17 DIAGNOSIS — K11.8 PAROTID MASS: ICD-10-CM

## 2024-07-22 ASSESSMENT — DUKE ACTIVITY SCORE INDEX (DASI)
CAN YOU CLIMB A FLIGHT OF STAIRS OR WALK UP A HILL: YES
CAN YOU DO HEAVY WORK AROUND THE HOUSE LIKE SCRUBBING FLOORS OR LIFTING AND MOVING HEAVY FURNITURE: NO
CAN YOU DO LIGHT WORK AROUND THE HOUSE LIKE DUSTING OR WASHING DISHES: YES
CAN YOU TAKE CARE OF YOURSELF (EAT, DRESS, BATHE, OR USE TOILET): YES
CAN YOU DO YARD WORK LIKE RAKING LEAVES, WEEDING OR PUSHING A MOWER: YES
DASI METS SCORE: 5.6
CAN YOU HAVE SEXUAL RELATIONS: NO
CAN YOU PARTICIPATE IN MODERATE RECREATIONAL ACTIVITIES LIKE GOLF, BOWLING, DANCING, DOUBLES TENNIS OR THROWING A BASEBALL OR FOOTBALL: NO
CAN YOU DO MODERATE WORK AROUND THE HOUSE LIKE VACUUMING, SWEEPING FLOORS OR CARRYING GROCERIES: YES
CAN YOU PARTICIPATE IN STRENOUS SPORTS LIKE SWIMMING, SINGLES TENNIS, FOOTBALL, BASKETBALL, OR SKIING: NO
CAN YOU WALK INDOORS, SUCH AS AROUND YOUR HOUSE: YES
TOTAL_SCORE: 23.45
CAN YOU WALK A BLOCK OR TWO ON LEVEL GROUND: YES
CAN YOU RUN A SHORT DISTANCE: NO

## 2024-07-22 ASSESSMENT — LIFESTYLE VARIABLES: SMOKING_STATUS: NONSMOKER

## 2024-07-22 ASSESSMENT — ACTIVITIES OF DAILY LIVING (ADL): ADL_SCORE: 0

## 2024-07-23 ENCOUNTER — PRE-ADMISSION TESTING (OUTPATIENT)
Dept: PREADMISSION TESTING | Facility: HOSPITAL | Age: 64
End: 2024-07-23
Payer: MEDICAID

## 2024-07-23 ENCOUNTER — LAB (OUTPATIENT)
Dept: LAB | Facility: LAB | Age: 64
End: 2024-07-23
Payer: MEDICAID

## 2024-07-23 VITALS
DIASTOLIC BLOOD PRESSURE: 78 MMHG | HEIGHT: 72 IN | HEART RATE: 83 BPM | WEIGHT: 200.18 LBS | RESPIRATION RATE: 16 BRPM | BODY MASS INDEX: 27.11 KG/M2 | OXYGEN SATURATION: 94 % | TEMPERATURE: 96.8 F | SYSTOLIC BLOOD PRESSURE: 130 MMHG

## 2024-07-23 DIAGNOSIS — K11.8 PAROTID MASS: Primary | ICD-10-CM

## 2024-07-23 DIAGNOSIS — K11.8 PAROTID MASS: ICD-10-CM

## 2024-07-23 DIAGNOSIS — Z01.818 PREOP EXAMINATION: ICD-10-CM

## 2024-07-23 LAB
ALBUMIN SERPL BCP-MCNC: 4.2 G/DL (ref 3.4–5)
ALP SERPL-CCNC: 117 U/L (ref 33–136)
ALT SERPL W P-5'-P-CCNC: 29 U/L (ref 10–52)
ANION GAP SERPL CALC-SCNC: 13 MMOL/L (ref 10–20)
AST SERPL W P-5'-P-CCNC: 23 U/L (ref 9–39)
ATRIAL RATE: 74 BPM
BILIRUB SERPL-MCNC: 0.3 MG/DL (ref 0–1.2)
BUN SERPL-MCNC: 29 MG/DL (ref 6–23)
CALCIUM SERPL-MCNC: 9 MG/DL (ref 8.6–10.3)
CHLORIDE SERPL-SCNC: 106 MMOL/L (ref 98–107)
CO2 SERPL-SCNC: 23 MMOL/L (ref 21–32)
CREAT SERPL-MCNC: 1.14 MG/DL (ref 0.5–1.3)
EGFRCR SERPLBLD CKD-EPI 2021: 72 ML/MIN/1.73M*2
ERYTHROCYTE [DISTWIDTH] IN BLOOD BY AUTOMATED COUNT: 16.3 % (ref 11.5–14.5)
GLUCOSE SERPL-MCNC: 149 MG/DL (ref 74–99)
HCT VFR BLD AUTO: 41.4 % (ref 41–52)
HGB BLD-MCNC: 13.5 G/DL (ref 13.5–17.5)
MCH RBC QN AUTO: 27.4 PG (ref 26–34)
MCHC RBC AUTO-ENTMCNC: 32.6 G/DL (ref 32–36)
MCV RBC AUTO: 84 FL (ref 80–100)
NRBC BLD-RTO: 0 /100 WBCS (ref 0–0)
P AXIS: 58 DEGREES
P OFFSET: 184 MS
P ONSET: 122 MS
PLATELET # BLD AUTO: 195 X10*3/UL (ref 150–450)
POTASSIUM SERPL-SCNC: 4 MMOL/L (ref 3.5–5.3)
PR INTERVAL: 196 MS
PROT SERPL-MCNC: 6.8 G/DL (ref 6.4–8.2)
Q ONSET: 220 MS
QRS COUNT: 13 BEATS
QRS DURATION: 112 MS
QT INTERVAL: 410 MS
QTC CALCULATION(BAZETT): 455 MS
QTC FREDERICIA: 440 MS
R AXIS: -32 DEGREES
RBC # BLD AUTO: 4.93 X10*6/UL (ref 4.5–5.9)
SODIUM SERPL-SCNC: 138 MMOL/L (ref 136–145)
T AXIS: 56 DEGREES
T OFFSET: 425 MS
VENTRICULAR RATE: 74 BPM
WBC # BLD AUTO: 5.3 X10*3/UL (ref 4.4–11.3)

## 2024-07-23 PROCEDURE — 80053 COMPREHEN METABOLIC PANEL: CPT

## 2024-07-23 PROCEDURE — 85027 COMPLETE CBC AUTOMATED: CPT

## 2024-07-23 PROCEDURE — 36415 COLL VENOUS BLD VENIPUNCTURE: CPT

## 2024-07-23 PROCEDURE — 93005 ELECTROCARDIOGRAM TRACING: CPT

## 2024-07-23 ASSESSMENT — PAIN - FUNCTIONAL ASSESSMENT: PAIN_FUNCTIONAL_ASSESSMENT: 0-10

## 2024-07-23 ASSESSMENT — PAIN SCALES - GENERAL: PAINLEVEL_OUTOF10: 0 - NO PAIN

## 2024-07-23 NOTE — H&P (VIEW-ONLY)
CPM/PAT Evaluation       Name: Ariel Pink (Ariel Pink)  /Age: 1960/64 y.o.     In-Person       Chief Complaint: Left parotid mass    HPI  Pleasant 63 y/o male presents with a left parotid mass. He had a biopsy of a left parotid mass in  and it resulted as Warthin's tumor. He was diagnosed with lymphoma in  and this was noted as a result of initial evaluation. He has continued to follow up and have PET scans. He continues to follow with oncology and would like to have this mass removed. He endorses a throbbing/painful sensation at times. He also has noticed swelling. Denies recent fever/chills.     Past Medical History:   Diagnosis Date    Aortic aneurysm (CMS-HCC)     Chronic kidney disease     Colon polyp     Diffuse large B cell lymphoma (Multi)     Hypertension     Parotid mass     Pulmonary embolism (Multi)     x2    Rheumatic fever        Past Surgical History:   Procedure Laterality Date    COLONOSCOPY      CT GUIDED PERCUTANEOUS BIOPSY LYMPH NODE SUPERFICIAL  2023    CT GUIDED PERCUTANEOUS BIOPSY LYMPH NODE SUPERFICIAL STJ CT    HERNIA REPAIR      IR TUNNELED CENTRAL PORT PLACEMENT N/A     SPINE SURGERY      Laminectomy       Patient  has no history on file for sexual activity.    Family History   Problem Relation Name Age of Onset    Cancer Mother         No Known Allergies    Prior to Admission medications    Medication Sig Start Date End Date Taking? Authorizing Provider   apixaban (Eliquis) 5 mg tablet Take 1 tablet (5 mg) by mouth 2 times a day.    Historical Provider, MD   HYDROcodone-acetaminophen (Vicodin)  mg tablet Take 1 tablet by mouth every 6 hours if needed for severe pain (7 - 10).    Historical Provider, MD   gabapentin (Neurontin) 300 mg capsule Take 1 capsule (300 mg) by mouth 3 times a day.  24  Historical Provider, MD        Constitutional: Negative for fever, chills, or sweats   ENMT: Negative for nasal discharge, congestion, ear pain, mouth  pain, throat pain. Positive for glasses. Positive for upper dentures.   Respiratory: Negative for cough, wheezing, shortness of breath   Cardiac: Negative for chest pain, dyspnea on exertion, palpitations. Positive for occasional CERDA for the past few years (since lymphoma diagnosis).    Gastrointestinal: Negative for nausea, vomiting, diarrhea, constipation, abdominal pain  Genitourinary: Negative for dysuria, flank pain, frequency, hematuria   Musculoskeletal: Negative for decreased ROM, pain, swelling, weakness. Positive for back pain and numbness/tingling in bilat. Anterior thighs since L2 compression fracture in August, 2023.    Neurological: Negative for dizziness, confusion, headache  Psychiatric: Negative for mood changes   Skin: Negative for itching, rash, ulcer    Hematologic/Lymph: Negative for bruising, easy bleeding  Allergic/Immunologic: Negative itching, sneezing, swelling      Physical Exam  Vitals reviewed.   Constitutional:       Appearance: Normal appearance.   HENT:      Head: Normocephalic.      Mouth/Throat:      Mouth: Mucous membranes are moist.      Pharynx: Oropharynx is clear.   Eyes:      Pupils: Pupils are equal, round, and reactive to light.   Cardiovascular:      Rate and Rhythm: Normal rate and regular rhythm.      Heart sounds: Normal heart sounds.   Pulmonary:      Effort: Pulmonary effort is normal.      Breath sounds: Normal breath sounds.   Abdominal:      General: Bowel sounds are normal.      Palpations: Abdomen is soft.   Musculoskeletal:         General: Normal range of motion.      Cervical back: Normal range of motion.   Skin:     General: Skin is warm and dry.   Neurological:      General: No focal deficit present.      Mental Status: He is alert and oriented to person, place, and time.   Psychiatric:         Mood and Affect: Mood normal.         Behavior: Behavior normal.          PAT AIRWAY:   Airway:     Mallampati::  II    Neck ROM::  Full  normal        Visit  Vitals  /78   Pulse 83   Temp 36 °C (96.8 °F) (Temporal)   Resp 16       DASI Risk Score      Flowsheet Row Most Recent Value   DASI SCORE 23.45   METS Score (Will be calculated only when all the questions are answered) 5.6          Caprini DVT Assessment      Flowsheet Row Most Recent Value   DVT Score 18   Current Status Major surgery planned, lasting over 3 hours, Present cancer or chemotherapy, Central venous access   History Prior major surgery, Previous malignancy, SVT, DVT/PE   Age 60-75 years   BMI 30 or less          Modified Frailty Index      Flowsheet Row Most Recent Value   Modified Frailty Index Calculator .0909          CHADS2 Stroke Risk  Current as of 51 minutes ago        N/A 3 to 100%: High Risk   2 to < 3%: Medium Risk   0 to < 2%: Low Risk     Last Change: N/A          This score determines the patient's risk of having a stroke if the patient has atrial fibrillation.        This score is not applicable to this patient. Components are not calculated.          Revised Cardiac Risk Index      Flowsheet Row Most Recent Value   Revised Cardiac Risk Calculator 1          Apfel Simplified Score    No data to display       Risk Analysis Index Results This Encounter         7/22/2024  1326             BLANC Cancer History: Patient indicates history of cancer    Total Risk Analysis Index Score Without Cancer: 25    Total Risk Analysis Index Score: 40          Stop Bang Score      Flowsheet Row Most Recent Value   Do you snore loudly? 1   Do you often feel tired or fatigued after your sleep? 0   Has anyone ever observed you stop breathing in your sleep? 0   Do you have or are you being treated for high blood pressure? 0   Recent BMI (Calculated) 28.3   Is BMI greater than 35 kg/m2? 0=No   Age older than 50 years old? 1=Yes   Is your neck circumference greater than 17 inches (Male) or 16 inches (Female)? 0   Gender - Male 1=Yes   STOP-BANG Total Score 3              Assessment and Plan:     Preop:   OR  "with Dr. Donis on 7/25 for a left parotidectomy   Labs ordered per Dr. Donis   EKG obtained and enclosed. NSR. Left axis deviation. Comparable EKG on file.     Cardiac:  Hypertension: Controlled without medication. He states he does check his BP daily at home and has BP medications to take if needed but he has not needed to take any.   Aortic aneurysm: \"Mild aneurysmal dilation of ascending thoracic aorta is noted.\"-noted in August of 2023.     Pulmonary:   Pulmonary embolism: x2. First in 2022. Led to the discovery of lymphoma. On Eliquis.     /Renal:   CKD: Stable on last bloodwork     Oncological:   Lymphoma: Initially diagnosed in August of 2023. He was treated with Polaa-R-CHP x6 as well as Ritux x2. Following with Dr. Chang-oncology. Last seen on 6/4/2024--Colonoscopy or sigmoidoscopy is recommended to evaluate the rectum within the next 3-4 weeks.   -Repeat CT neck q6 w x 2 may also be considered.   -If the above studies do not raise further suspicion or evidence of relapse, he can be followed with imaging q6 mon. Due to extranodal involvement, PET/CT is preferred.   -Patient will follow up with Dr. Jay of [facility]. He will be welcome to return for further discussion if needed. \". Colonoscopy was completed on 6/19.     Neuro-muscular:   Compression fracture at L2: L2 laminectomy and biopsy in August of 2023     Anesthesia: Patient has no history of anesthesia complications. No anesthesia concerns.      *See risk scores as previously documented   "

## 2024-07-23 NOTE — PREPROCEDURE INSTRUCTIONS
Medication List            Accurate as of July 23, 2024  1:56 PM. Always use your most recent med list.                Eliquis 5 mg tablet  Generic drug: apixaban  Medication Adjustments for Surgery: Other (Comment)  Notes to patient: Coordinate with prescribing provider for further instructions on this medications prior to surgery.     HYDROcodone-acetaminophen  mg tablet  Commonly known as: Vicodin  Medication Adjustments for Surgery: Other (Comment)  Notes to patient: As needed                         PRE-OPERATIVE INSTRUCTIONS    You will receive notification one business day prior to your procedure to confirm your arrival time. It is important that you answer your phone and/or check your messages during this time. If you do not hear from the surgery center by 5 pm. the day before your procedure, please call [phone].     Please enter the building through the Outpatient entrance and take the elevator off the lobby to the 2nd floor then check in at the Outpatient Surgery desk on the 2nd floor.    INSTRUCTIONS:  Talk to your surgeon for instructions if you should stop your aspirin, blood thinner, or diabetes medicines.  DO NOT take any multivitamins or over the counter supplements for 7-10 days before surgery.  If not being admitted, you must have an adult immediately available to drive you home after surgery. We also highly recommend you have someone stay with you for the entire day and night of your surgery.  For children having surgery, a parent or legal guardian must accompany them to the surgery center. If this is not possible, please call [phone] to make additional arrangements.  For adults who are unable to consent or make medical decisions for themselves, a legal guardian or Power of  must accompany them to the surgery center. If this is not possible, please call [phone] to make additional arrangements.  Wear comfortable, loose fitting clothing.  All jewelry and piercings  must be removed. If you are unable to remove an item or have a dermal piercing, please be sure to tell the nurse when you arrive for surgery.  Nail polish and make-up must be removed.  Avoid smoking or consuming alcohol for 24 hours before surgery.  To help prevent infection, please take a shower/bath and wash your hair the night before and/or morning of surgery (or follow other specific bathing instructions provided).    Preoperative Fasting Guidelines    Why must I stop eating and drinking near surgery time?  With sedation, food or liquid in your stomach can enter your lungs causing serious complications  Increases nausea and vomiting    When do I need to stop eating and drinking before my surgery?  Do not eat any solid food after midnight the night before your surgery/procedure unless otherwise instructed by your surgeon.   You may have up to 13.5 ounces of clear liquid until TWO hours before your instructed arrival time to the hospital.  This includes water, black tea/coffee, (no milk or cream) apple juice, and electrolyte drinks (Gatorade).   You may chew gum until TWO hours before your surgery/procedure      If you have any questions or concerns, please call Pre-Admission Testing at [phone].     Home Preoperative Antibacterial Shower with Chlorhexidine gluconate (CHG)     What is a home preoperative antibacterial shower?  This shower is a way of cleaning the skin with a germ killing solution before surgery. The solution contains chlorhexidine gluconate, commonly known as CHG. CHG is a skin cleanser with germ killing ability. Let your doctor know if you are allergic to chlorhexidine.    Why do I need to take a preoperative antibacterial shower?  Skin is not sterile. It is best to try to make your skin as free of germs as possible before surgery. Proper cleansing with a germ killing soap before surgery can lower the number of germs on your skin. This helps to reduce the risk of infection at the surgical  site. Following the instructions listed below will help you prepare your skin for surgery.    How do I use the solution?  Begin using your CHG soap the night before and again the morning of your procedure.   Do not shave the day before or day of surgery.  Remove all jewelry until after surgery. Take off rings and take out all body-piercing jewelry.  Wash your face and hair with normal soap and shampoo before you use the CHG soap.  Apply the CHG solution to a clean wet washcloth. Move away from the water to avoid premature rinsing of the CHG soap as you are applying. Firmly lather your entire body from the neck down. Do not use CHG on your face, eyes, ears, or genitals.   Pay special attention to the area where your incisions will be located.  Do not scrub your skin too hard.  It is important to allow the CHG soap to sit on your skin for 3-5 minutes.  Rinse the solution off your body completely. Do not wash with your normal soap after the CHG soap solution.  Pat yourself dry with a clean, soft towel.  Do not apply powders, lotions or deodorants as these might block how the CHG soap works.   Dress in clean clothing.  Be sure to sleep with clean, freshly laundered sheets.  Be aware that CHG can cause stains on fabric. Rinse your washcloth and other linens that have contact with CHG completely. Use only non-chlorine detergents to launder the items used.

## 2024-07-23 NOTE — CPM/PAT H&P
CPM/PAT Evaluation       Name: Ariel Pink (Ariel Pink)  /Age: 1960/64 y.o.     In-Person       Chief Complaint: Left parotid mass    HPI  Pleasant 65 y/o male presents with a left parotid mass. He had a biopsy of a left parotid mass in  and it resulted as Warthin's tumor. He was diagnosed with lymphoma in  and this was noted as a result of initial evaluation. He has continued to follow up and have PET scans. He continues to follow with oncology and would like to have this mass removed. He endorses a throbbing/painful sensation at times. He also has noticed swelling. Denies recent fever/chills.     Past Medical History:   Diagnosis Date    Aortic aneurysm (CMS-HCC)     Chronic kidney disease     Colon polyp     Diffuse large B cell lymphoma (Multi)     Hypertension     Parotid mass     Pulmonary embolism (Multi)     x2    Rheumatic fever        Past Surgical History:   Procedure Laterality Date    COLONOSCOPY      CT GUIDED PERCUTANEOUS BIOPSY LYMPH NODE SUPERFICIAL  2023    CT GUIDED PERCUTANEOUS BIOPSY LYMPH NODE SUPERFICIAL [facility]    HERNIA REPAIR      IR TUNNELED CENTRAL PORT PLACEMENT N/A     SPINE SURGERY      Laminectomy       Patient  has no history on file for sexual activity.    Family History   Problem Relation Name Age of Onset    Cancer Mother         No Known Allergies    Prior to Admission medications    Medication Sig Start Date End Date Taking? Authorizing Provider   apixaban (Eliquis) 5 mg tablet Take 1 tablet (5 mg) by mouth 2 times a day.    Historical Provider, MD   HYDROcodone-acetaminophen (Vicodin)  mg tablet Take 1 tablet by mouth every 6 hours if needed for severe pain (7 - 10).    Historical Provider, MD   gabapentin (Neurontin) 300 mg capsule Take 1 capsule (300 mg) by mouth 3 times a day.  24  Historical Provider, MD        Constitutional: Negative for fever, chills, or sweats   ENMT: Negative for nasal discharge, congestion, ear pain, mouth  pain, throat pain. Positive for glasses. Positive for upper dentures.   Respiratory: Negative for cough, wheezing, shortness of breath   Cardiac: Negative for chest pain, dyspnea on exertion, palpitations. Positive for occasional CERDA for the past few years (since lymphoma diagnosis).    Gastrointestinal: Negative for nausea, vomiting, diarrhea, constipation, abdominal pain  Genitourinary: Negative for dysuria, flank pain, frequency, hematuria   Musculoskeletal: Negative for decreased ROM, pain, swelling, weakness. Positive for back pain and numbness/tingling in bilat. Anterior thighs since L2 compression fracture in August, 2023.    Neurological: Negative for dizziness, confusion, headache  Psychiatric: Negative for mood changes   Skin: Negative for itching, rash, ulcer    Hematologic/Lymph: Negative for bruising, easy bleeding  Allergic/Immunologic: Negative itching, sneezing, swelling      Physical Exam  Vitals reviewed.   Constitutional:       Appearance: Normal appearance.   HENT:      Head: Normocephalic.      Mouth/Throat:      Mouth: Mucous membranes are moist.      Pharynx: Oropharynx is clear.   Eyes:      Pupils: Pupils are equal, round, and reactive to light.   Cardiovascular:      Rate and Rhythm: Normal rate and regular rhythm.      Heart sounds: Normal heart sounds.   Pulmonary:      Effort: Pulmonary effort is normal.      Breath sounds: Normal breath sounds.   Abdominal:      General: Bowel sounds are normal.      Palpations: Abdomen is soft.   Musculoskeletal:         General: Normal range of motion.      Cervical back: Normal range of motion.   Skin:     General: Skin is warm and dry.   Neurological:      General: No focal deficit present.      Mental Status: He is alert and oriented to person, place, and time.   Psychiatric:         Mood and Affect: Mood normal.         Behavior: Behavior normal.          PAT AIRWAY:   Airway:     Mallampati::  II    Neck ROM::  Full  normal        Visit  Vitals  /78   Pulse 83   Temp 36 °C (96.8 °F) (Temporal)   Resp 16       DASI Risk Score      Flowsheet Row Most Recent Value   DASI SCORE 23.45   METS Score (Will be calculated only when all the questions are answered) 5.6          Caprini DVT Assessment      Flowsheet Row Most Recent Value   DVT Score 18   Current Status Major surgery planned, lasting over 3 hours, Present cancer or chemotherapy, Central venous access   History Prior major surgery, Previous malignancy, SVT, DVT/PE   Age 60-75 years   BMI 30 or less          Modified Frailty Index      Flowsheet Row Most Recent Value   Modified Frailty Index Calculator .0909          CHADS2 Stroke Risk  Current as of 51 minutes ago        N/A 3 to 100%: High Risk   2 to < 3%: Medium Risk   0 to < 2%: Low Risk     Last Change: N/A          This score determines the patient's risk of having a stroke if the patient has atrial fibrillation.        This score is not applicable to this patient. Components are not calculated.          Revised Cardiac Risk Index      Flowsheet Row Most Recent Value   Revised Cardiac Risk Calculator 1          Apfel Simplified Score    No data to display       Risk Analysis Index Results This Encounter         7/22/2024  1326             BLANC Cancer History: Patient indicates history of cancer    Total Risk Analysis Index Score Without Cancer: 25    Total Risk Analysis Index Score: 40          Stop Bang Score      Flowsheet Row Most Recent Value   Do you snore loudly? 1   Do you often feel tired or fatigued after your sleep? 0   Has anyone ever observed you stop breathing in your sleep? 0   Do you have or are you being treated for high blood pressure? 0   Recent BMI (Calculated) 28.3   Is BMI greater than 35 kg/m2? 0=No   Age older than 50 years old? 1=Yes   Is your neck circumference greater than 17 inches (Male) or 16 inches (Female)? 0   Gender - Male 1=Yes   STOP-BANG Total Score 3              Assessment and Plan:     Preop:   OR  "with Dr. Donis on 7/25 for a left parotidectomy   Labs ordered per Dr. Donis   EKG obtained and enclosed. NSR. Left axis deviation. Comparable EKG on file.     Cardiac:  Hypertension: Controlled without medication. He states he does check his BP daily at home and has BP medications to take if needed but he has not needed to take any.   Aortic aneurysm: \"Mild aneurysmal dilation of ascending thoracic aorta is noted.\"-noted in August of 2023.     Pulmonary:   Pulmonary embolism: x2. First in 2022. Led to the discovery of lymphoma. On Eliquis.     /Renal:   CKD: Stable on last bloodwork     Oncological:   Lymphoma: Initially diagnosed in August of 2023. He was treated with Polaa-R-CHP x6 as well as Ritux x2. Following with Dr. Chang-oncology. Last seen on 6/4/2024--Colonoscopy or sigmoidoscopy is recommended to evaluate the rectum within the next 3-4 weeks.   -Repeat CT neck q6 w x 2 may also be considered.   -If the above studies do not raise further suspicion or evidence of relapse, he can be followed with imaging q6 mon. Due to extranodal involvement, PET/CT is preferred.   -Patient will follow up with Dr. Jay of [facility]. He will be welcome to return for further discussion if needed. \". Colonoscopy was completed on 6/19.     Neuro-muscular:   Compression fracture at L2: L2 laminectomy and biopsy in August of 2023     Anesthesia: Patient has no history of anesthesia complications. No anesthesia concerns.      *See risk scores as previously documented   "

## 2024-07-25 ENCOUNTER — ANESTHESIA (OUTPATIENT)
Dept: OPERATING ROOM | Facility: HOSPITAL | Age: 64
End: 2024-07-25
Payer: MEDICAID

## 2024-07-25 ENCOUNTER — HOSPITAL ENCOUNTER (OUTPATIENT)
Facility: HOSPITAL | Age: 64
Setting detail: OUTPATIENT SURGERY
Discharge: HOME | End: 2024-07-25
Attending: OTOLARYNGOLOGY | Admitting: OTOLARYNGOLOGY
Payer: MEDICAID

## 2024-07-25 ENCOUNTER — ANESTHESIA EVENT (OUTPATIENT)
Dept: OPERATING ROOM | Facility: HOSPITAL | Age: 64
End: 2024-07-25
Payer: MEDICAID

## 2024-07-25 VITALS
DIASTOLIC BLOOD PRESSURE: 83 MMHG | HEART RATE: 66 BPM | OXYGEN SATURATION: 95 % | RESPIRATION RATE: 16 BRPM | TEMPERATURE: 96.6 F | SYSTOLIC BLOOD PRESSURE: 118 MMHG | BODY MASS INDEX: 27.09 KG/M2 | WEIGHT: 200 LBS | HEIGHT: 72 IN

## 2024-07-25 DIAGNOSIS — K11.8 PAROTID MASS: Primary | ICD-10-CM

## 2024-07-25 PROCEDURE — 88307 TISSUE EXAM BY PATHOLOGIST: CPT | Mod: TC,STJLAB,WESLAB | Performed by: OTOLARYNGOLOGY

## 2024-07-25 PROCEDURE — 2500000004 HC RX 250 GENERAL PHARMACY W/ HCPCS (ALT 636 FOR OP/ED): Performed by: ANESTHESIOLOGIST ASSISTANT

## 2024-07-25 PROCEDURE — 2720000007 HC OR 272 NO HCPCS: Performed by: OTOLARYNGOLOGY

## 2024-07-25 PROCEDURE — 7100000001 HC RECOVERY ROOM TIME - INITIAL BASE CHARGE: Performed by: OTOLARYNGOLOGY

## 2024-07-25 PROCEDURE — 42410 EXCISE PAROTID GLAND/LESION: CPT | Performed by: OTOLARYNGOLOGY

## 2024-07-25 PROCEDURE — 2500000005 HC RX 250 GENERAL PHARMACY W/O HCPCS: Performed by: OTOLARYNGOLOGY

## 2024-07-25 PROCEDURE — 7100000009 HC PHASE TWO TIME - INITIAL BASE CHARGE: Performed by: OTOLARYNGOLOGY

## 2024-07-25 PROCEDURE — 7100000002 HC RECOVERY ROOM TIME - EACH INCREMENTAL 1 MINUTE: Performed by: OTOLARYNGOLOGY

## 2024-07-25 PROCEDURE — 3600000008 HC OR TIME - EACH INCREMENTAL 1 MINUTE - PROCEDURE LEVEL THREE: Performed by: OTOLARYNGOLOGY

## 2024-07-25 PROCEDURE — 3700000002 HC GENERAL ANESTHESIA TIME - EACH INCREMENTAL 1 MINUTE: Performed by: OTOLARYNGOLOGY

## 2024-07-25 PROCEDURE — 2500000004 HC RX 250 GENERAL PHARMACY W/ HCPCS (ALT 636 FOR OP/ED): Performed by: ANESTHESIOLOGY

## 2024-07-25 PROCEDURE — A42410 PR EXC PAROTD LESN,LATER LOBE: Performed by: ANESTHESIOLOGY

## 2024-07-25 PROCEDURE — 2500000005 HC RX 250 GENERAL PHARMACY W/O HCPCS: Performed by: ANESTHESIOLOGIST ASSISTANT

## 2024-07-25 PROCEDURE — 3600000003 HC OR TIME - INITIAL BASE CHARGE - PROCEDURE LEVEL THREE: Performed by: OTOLARYNGOLOGY

## 2024-07-25 PROCEDURE — A42410 PR EXC PAROTD LESN,LATER LOBE: Performed by: ANESTHESIOLOGIST ASSISTANT

## 2024-07-25 PROCEDURE — 7100000010 HC PHASE TWO TIME - EACH INCREMENTAL 1 MINUTE: Performed by: OTOLARYNGOLOGY

## 2024-07-25 PROCEDURE — 3700000001 HC GENERAL ANESTHESIA TIME - INITIAL BASE CHARGE: Performed by: OTOLARYNGOLOGY

## 2024-07-25 RX ORDER — ALBUTEROL SULFATE 0.83 MG/ML
2.5 SOLUTION RESPIRATORY (INHALATION)
Status: DISCONTINUED | OUTPATIENT
Start: 2024-07-25 | End: 2024-07-25 | Stop reason: HOSPADM

## 2024-07-25 RX ORDER — HYDROMORPHONE HYDROCHLORIDE 1 MG/ML
INJECTION, SOLUTION INTRAMUSCULAR; INTRAVENOUS; SUBCUTANEOUS AS NEEDED
Status: DISCONTINUED | OUTPATIENT
Start: 2024-07-25 | End: 2024-07-25

## 2024-07-25 RX ORDER — ONDANSETRON HYDROCHLORIDE 2 MG/ML
INJECTION, SOLUTION INTRAVENOUS AS NEEDED
Status: DISCONTINUED | OUTPATIENT
Start: 2024-07-25 | End: 2024-07-25

## 2024-07-25 RX ORDER — HYDRALAZINE HYDROCHLORIDE 20 MG/ML
5 INJECTION INTRAMUSCULAR; INTRAVENOUS EVERY 30 MIN PRN
Status: DISCONTINUED | OUTPATIENT
Start: 2024-07-25 | End: 2024-07-25 | Stop reason: HOSPADM

## 2024-07-25 RX ORDER — MIDAZOLAM HYDROCHLORIDE 1 MG/ML
INJECTION, SOLUTION INTRAMUSCULAR; INTRAVENOUS AS NEEDED
Status: DISCONTINUED | OUTPATIENT
Start: 2024-07-25 | End: 2024-07-25

## 2024-07-25 RX ORDER — METOCLOPRAMIDE HYDROCHLORIDE 5 MG/ML
10 INJECTION INTRAMUSCULAR; INTRAVENOUS ONCE AS NEEDED
Status: DISCONTINUED | OUTPATIENT
Start: 2024-07-25 | End: 2024-07-25 | Stop reason: HOSPADM

## 2024-07-25 RX ORDER — PHENYLEPHRINE 10 MG/250 ML(40 MCG/ML)IN 0.9 % SOD.CHLORIDE INTRAVENOUS
CONTINUOUS PRN
Status: DISCONTINUED | OUTPATIENT
Start: 2024-07-25 | End: 2024-07-25

## 2024-07-25 RX ORDER — SODIUM CHLORIDE, SODIUM LACTATE, POTASSIUM CHLORIDE, CALCIUM CHLORIDE 600; 310; 30; 20 MG/100ML; MG/100ML; MG/100ML; MG/100ML
100 INJECTION, SOLUTION INTRAVENOUS CONTINUOUS
Status: DISCONTINUED | OUTPATIENT
Start: 2024-07-25 | End: 2024-07-25 | Stop reason: HOSPADM

## 2024-07-25 RX ORDER — CEFAZOLIN SODIUM 2 G/100ML
INJECTION, SOLUTION INTRAVENOUS AS NEEDED
Status: DISCONTINUED | OUTPATIENT
Start: 2024-07-25 | End: 2024-07-25

## 2024-07-25 RX ORDER — FENTANYL CITRATE 50 UG/ML
INJECTION, SOLUTION INTRAMUSCULAR; INTRAVENOUS AS NEEDED
Status: DISCONTINUED | OUTPATIENT
Start: 2024-07-25 | End: 2024-07-25

## 2024-07-25 RX ORDER — GLYCOPYRROLATE 0.2 MG/ML
INJECTION INTRAMUSCULAR; INTRAVENOUS AS NEEDED
Status: DISCONTINUED | OUTPATIENT
Start: 2024-07-25 | End: 2024-07-25

## 2024-07-25 RX ORDER — LIDOCAINE HYDROCHLORIDE AND EPINEPHRINE 10; 10 MG/ML; UG/ML
INJECTION, SOLUTION INFILTRATION; PERINEURAL AS NEEDED
Status: DISCONTINUED | OUTPATIENT
Start: 2024-07-25 | End: 2024-07-25 | Stop reason: HOSPADM

## 2024-07-25 RX ORDER — DIPHENHYDRAMINE HYDROCHLORIDE 50 MG/ML
12.5 INJECTION INTRAMUSCULAR; INTRAVENOUS ONCE AS NEEDED
Status: COMPLETED | OUTPATIENT
Start: 2024-07-25 | End: 2024-07-25

## 2024-07-25 RX ORDER — SODIUM CHLORIDE, SODIUM LACTATE, POTASSIUM CHLORIDE, CALCIUM CHLORIDE 600; 310; 30; 20 MG/100ML; MG/100ML; MG/100ML; MG/100ML
INJECTION, SOLUTION INTRAVENOUS CONTINUOUS PRN
Status: DISCONTINUED | OUTPATIENT
Start: 2024-07-25 | End: 2024-07-25

## 2024-07-25 RX ORDER — MIDAZOLAM HYDROCHLORIDE 1 MG/ML
1 INJECTION, SOLUTION INTRAMUSCULAR; INTRAVENOUS ONCE AS NEEDED
Status: DISCONTINUED | OUTPATIENT
Start: 2024-07-25 | End: 2024-07-25 | Stop reason: HOSPADM

## 2024-07-25 RX ORDER — PHENYLEPHRINE HYDROCHLORIDE 10 MG/ML
INJECTION INTRAVENOUS AS NEEDED
Status: DISCONTINUED | OUTPATIENT
Start: 2024-07-25 | End: 2024-07-25

## 2024-07-25 RX ORDER — LIDOCAINE HYDROCHLORIDE 20 MG/ML
INJECTION, SOLUTION EPIDURAL; INFILTRATION; INTRACAUDAL; PERINEURAL AS NEEDED
Status: DISCONTINUED | OUTPATIENT
Start: 2024-07-25 | End: 2024-07-25

## 2024-07-25 RX ORDER — ONDANSETRON HYDROCHLORIDE 2 MG/ML
4 INJECTION, SOLUTION INTRAVENOUS ONCE AS NEEDED
Status: DISCONTINUED | OUTPATIENT
Start: 2024-07-25 | End: 2024-07-25 | Stop reason: HOSPADM

## 2024-07-25 RX ORDER — PROPOFOL 10 MG/ML
INJECTION, EMULSION INTRAVENOUS AS NEEDED
Status: DISCONTINUED | OUTPATIENT
Start: 2024-07-25 | End: 2024-07-25

## 2024-07-25 RX ORDER — LABETALOL HYDROCHLORIDE 5 MG/ML
5 INJECTION, SOLUTION INTRAVENOUS
Status: DISCONTINUED | OUTPATIENT
Start: 2024-07-25 | End: 2024-07-25 | Stop reason: HOSPADM

## 2024-07-25 RX ORDER — LIDOCAINE HYDROCHLORIDE 10 MG/ML
0.1 INJECTION INFILTRATION; PERINEURAL ONCE
Status: DISCONTINUED | OUTPATIENT
Start: 2024-07-25 | End: 2024-07-25 | Stop reason: HOSPADM

## 2024-07-25 RX ORDER — SUCCINYLCHOLINE/SOD CL,ISO/PF 100 MG/5ML
SYRINGE (ML) INTRAVENOUS AS NEEDED
Status: DISCONTINUED | OUTPATIENT
Start: 2024-07-25 | End: 2024-07-25

## 2024-07-25 RX ORDER — HYDROCODONE BITARTRATE AND ACETAMINOPHEN 5; 325 MG/1; MG/1
1 TABLET ORAL EVERY 4 HOURS PRN
Status: DISCONTINUED | OUTPATIENT
Start: 2024-07-25 | End: 2024-07-25 | Stop reason: HOSPADM

## 2024-07-25 RX ORDER — HYDROMORPHONE HYDROCHLORIDE 1 MG/ML
1 INJECTION, SOLUTION INTRAMUSCULAR; INTRAVENOUS; SUBCUTANEOUS EVERY 5 MIN PRN
Status: DISCONTINUED | OUTPATIENT
Start: 2024-07-25 | End: 2024-07-25 | Stop reason: HOSPADM

## 2024-07-25 SDOH — HEALTH STABILITY: MENTAL HEALTH: CURRENT SMOKER: 0

## 2024-07-25 ASSESSMENT — PAIN SCALES - GENERAL
PAINLEVEL_OUTOF10: 0 - NO PAIN
PAINLEVEL_OUTOF10: 7
PAINLEVEL_OUTOF10: 8
PAINLEVEL_OUTOF10: 8
PAINLEVEL_OUTOF10: 0 - NO PAIN
PAINLEVEL_OUTOF10: 6
PAINLEVEL_OUTOF10: 0 - NO PAIN

## 2024-07-25 ASSESSMENT — PAIN - FUNCTIONAL ASSESSMENT: PAIN_FUNCTIONAL_ASSESSMENT: 0-10

## 2024-07-25 ASSESSMENT — PAIN DESCRIPTION - LOCATION: LOCATION: BACK

## 2024-07-25 NOTE — ANESTHESIA PREPROCEDURE EVALUATION
Patient: Ariel Pink    Procedure Information       Date/Time: 07/25/24 1335    Procedure: Parotidectomy (Left)    Location: [facility] OR 02 / [facility]    Surgeons: Alexi Donis MD            Relevant Problems   No relevant active problems       Clinical information reviewed:   Tobacco  Allergies  Meds   Med Hx  Surg Hx   Fam Hx  Soc Hx        NPO Detail:  NPO/Void Status  Date of Last Liquid: 07/25/24  Time of Last Liquid: 0700  Date of Last Solid: 07/24/24  Time of Last Solid: 1500  Time of Last Void: 0900         Physical Exam    Airway  Mallampati: III  TM distance: >3 FB  Neck ROM: full     Cardiovascular - normal exam     Dental   (+) upper dentures     Pulmonary - normal exam     Abdominal - normal exam           Vitals:    07/25/24 1148   BP: (!) 143/92   Pulse: 71   Resp: 15   Temp: 36.1 °C (97 °F)   SpO2: 98%       Past Surgical History:   Procedure Laterality Date    COLONOSCOPY      CT GUIDED PERCUTANEOUS BIOPSY LYMPH NODE SUPERFICIAL  03/29/2023    CT GUIDED PERCUTANEOUS BIOPSY LYMPH NODE SUPERFICIAL [facility]    HERNIA REPAIR      IR TUNNELED CENTRAL PORT PLACEMENT N/A     SPINE SURGERY      Laminectomy     Past Medical History:   Diagnosis Date    Aortic aneurysm (CMS-HCC)     Chronic kidney disease     Colon polyp     Diffuse large B cell lymphoma (Multi)     Hypertension     Parotid mass     Pulmonary embolism (Multi)     x2    Rheumatic fever      No current facility-administered medications for this encounter.  Prior to Admission medications    Medication Sig Start Date End Date Taking? Authorizing Provider   HYDROcodone-acetaminophen (Vicodin)  mg tablet Take 1 tablet by mouth every 6 hours if needed for severe pain (7 - 10).   Yes Historical Provider, MD   apixaban (Eliquis) 5 mg tablet Take 1 tablet (5 mg) by mouth 2 times a day.    Historical Provider, MD   gabapentin (Neurontin) 300 mg capsule Take 1 capsule (300 mg) by mouth 3 times a day.  7/22/24  Historical Provider, MD      No Known Allergies  Social History     Tobacco Use    Smoking status: Former     Average packs/day: 1.5 packs/day for 47.0 years (70.5 ttl pk-yrs)     Types: Cigarettes     Start date: 1975    Smokeless tobacco: Never   Substance Use Topics    Alcohol use: Never         Chemistry    Lab Results   Component Value Date/Time     07/23/2024 1409    K 4.0 07/23/2024 1409     07/23/2024 1409    CO2 23 07/23/2024 1409    BUN 29 (H) 07/23/2024 1409    CREATININE 1.14 07/23/2024 1409    Lab Results   Component Value Date/Time    CALCIUM 9.0 07/23/2024 1409    ALKPHOS 117 07/23/2024 1409    AST 23 07/23/2024 1409    ALT 29 07/23/2024 1409    BILITOT 0.3 07/23/2024 1409          Lab Results   Component Value Date/Time    WBC 5.3 07/23/2024 1409    HGB 13.5 07/23/2024 1409    HCT 41.4 07/23/2024 1409     07/23/2024 1409     Lab Results   Component Value Date/Time    PROTIME 11.2 03/28/2023 1016    INR 1.0 03/28/2023 1016     Encounter Date: 07/23/24   ECG 12 lead (Clinic Performed)   Result Value    Ventricular Rate 74    Atrial Rate 74    NJ Interval 196    QRS Duration 112    QT Interval 410    QTC Calculation(Bazett) 455    P Axis 58    R Axis -32    T Axis 56    QRS Count 13    Q Onset 220    P Onset 122    P Offset 184    T Offset 425    QTC Fredericia 440    Narrative    Normal sinus rhythm  Left axis deviation  Septal infarct , age undetermined  Abnormal ECG  No previous ECGs available        Anesthesia Plan    History of general anesthesia?: yes  History of complications of general anesthesia?: no    ASA 3     general     The patient is not a current smoker.    intravenous induction   Anesthetic plan and risks discussed with patient.    Plan discussed with CAA.

## 2024-07-25 NOTE — DISCHARGE INSTRUCTIONS
.Facial/Neck Incision Care: Cleanse all facial/neck incisions twice daily with baby shampoo or mild soap and water. Apply petroleum jelly/vaseline to incision line twice daily until incision has healed.     -Continue to watch the area for swelling, redness, drainage.  If you have any issues during daytime hours contact [phone].  If after hours call [phone] and ask for the ENT resident on-call    Please hold your ,apixaban (Eliquis) for 3 days following surgery. You may resume your Eliquis on 7/28/24

## 2024-07-25 NOTE — ANESTHESIA PROCEDURE NOTES
Airway  Date/Time: 7/25/2024 1:10 PM  Urgency: elective    Airway not difficult    Staffing  Performed: RAUL   Authorized by: Kelton Dye MD    Performed by: RAUL Ho  Patient location during procedure: OR    Indications and Patient Condition  Indications for airway management: anesthesia  Spontaneous ventilation: present  Sedation level: deep  Preoxygenated: yes  Patient position: sniffing  Mask difficulty assessment: 1 - vent by mask    Final Airway Details  Final airway type: endotracheal airway      Successful airway: ETT     Successful intubation technique: direct laryngoscopy  Facilitating devices/methods: intubating stylet  Blade: Rohini  Blade size: #4  ETT size (mm): 7.5  Cormack-Lehane Classification: grade I - full view of glottis  Placement verified by: chest auscultation and capnometry   Measured from: gums  ETT to gums (cm): 24  Number of attempts at approach: 1

## 2024-07-25 NOTE — ANESTHESIA POSTPROCEDURE EVALUATION
Patient: Ariel Pink    Procedure Summary       Date: 07/25/24 Room / Location: [facility] OR 02 / [facility]    Anesthesia Start: 1303 Anesthesia Stop: 1442    Procedure: Excision left parotid mass (Left: Neck) Diagnosis:       Parotid mass      (Parotid mass [K11.8])    Surgeons: Alexi Donis MD Responsible Provider: Kelton Dye MD    Anesthesia Type: general ASA Status: 3            Anesthesia Type: general    Vitals Value Taken Time   /64 07/25/24 1545   Temp 36.4 °C (97.5 °F) 07/25/24 1545   Pulse 64 07/25/24 1554   Resp 17 07/25/24 1554   SpO2 96 % 07/25/24 1554   Vitals shown include unfiled device data.    Anesthesia Post Evaluation    Patient location during evaluation: PACU  Patient participation: complete - patient participated  Level of consciousness: sleepy but conscious  Pain management: satisfactory to patient  Airway patency: patent  Cardiovascular status: acceptable  Respiratory status: acceptable  Hydration status: euvolemic  Postoperative Nausea and Vomiting: none        No notable events documented.

## 2024-07-25 NOTE — OP NOTE
Excision left parotid mass (L) Operative Note     Date: 2024  OR Location: [facility] OR    Name: Ariel Pink, : 1960, Age: 64 y.o., MRN: [id number], Sex: male    Diagnosis  Pre-op Diagnosis      * Parotid mass [K11.8] Post-op Diagnosis     * Parotid mass [K11.8]     Procedures  Excision left parotid mass  [id number] - WI EXC PRTD SHYAM/PRTD GLND LAT LOBE W/O NRV [facility]      Surgeons      * Alexi Donis - Primary    Resident/Fellow/Other Assistant:  Surgeons and Role:  * No surgeons found with a matching role *    Procedure Summary  Anesthesia: General  ASA: III  Anesthesia Staff: Anesthesiologist: Kelton Dye MD  C-AA: RAUL Ho  Estimated Blood Loss: 10 mL  Intra-op Medications: Administrations occurring from 1335 to 1605 on 24:  * No intraprocedure medications in log *           Anesthesia Record               Intraprocedure I/O Totals          Intake    Remifentanil Drip 0.00 mL    The total shown is the total volume documented since Anesthesia Start was filed.    Phenylephrine Drip 0.00 mL    The total shown is the total volume documented since Anesthesia Start was filed.    Total Intake 0 mL          Specimen:   ID Type Source Tests Collected by Time   1 : LEFT PAROTID MASS Tissue PAROTID RESECTION LEFT SURGICAL PATHOLOGY EXAM Alexi Donis MD 2024 1403        Staff:   Circulator: Cathy Newsome Person: [place]      Indications: Ariel Pink is an 64 y.o. male who is having surgery for Parotid mass [K11.8].     The patient was seen in the preoperative area. The risks, benefits, complications, treatment options, non-operative alternatives, expected recovery and outcomes were discussed with the patient. The possibilities of reaction to medication, pulmonary aspiration, injury to surrounding structures, bleeding, recurrent infection, the need for additional procedures, failure to diagnose a condition, and creating a complication requiring transfusion or operation were discussed  with the patient. The patient concurred with the proposed plan, giving informed consent.  The site of surgery was properly noted/marked if necessary per policy. The patient has been actively warmed in preoperative area. Preoperative antibiotics have been ordered and given within 1 hours of incision. Venous thrombosis prophylaxis have been ordered including bilateral sequential compression devices    Procedure Details: Patient was taken back to the OR and laid supine on the table.  Timeout was performed, general anesthesia induced, patient was intubated.  The table was turned and the Nims facial nerve monitor was applied.  The circuit was connected and functioning appropriately.  A modified Maurice incision was marked on the left face and neck.  The neck was then prepped and draped in sterile fashion.  A 15 blade was used to make skin incision down into the subcutaneous fat.  A skin flap was elevated anteriorly to the anterior aspect of the mass over the parotid fascia.  The tail of the parotid was elevated away from the sternocleidomastoid muscle.  The greater auricular nerve was coursing anteriorly and some of the distal branches were closely associated with the mass.  The parotid fascia was released to allow retraction of the mass.  The posterior belly of the digastric was also identified.  At this point we were able to work circumferentially around the mass releasing a small cuff of parotid tissue until the mass was completely removed.  The area was copiously irrigated.  Fibrillar was placed in the surgical bed.  3-0 Vicryl suture was used to reapproximate the parotid fascia to the sternocleidomastoid muscle.  The incision was closed in layers using 3-0 Vicryl for the deep layer and running 5-0 fast for the skin.  Patient was extubated without issue and taken to PACU stable condition  Complications: None immediately apparent  Disposition: PACU - hemodynamically stable.  Condition: stable         Alexi RICHMOND  [place]  Phone Number: [phone]

## 2024-08-02 LAB
ATRIAL RATE: 74 BPM
LAB AP ASR DISCLAIMER: NORMAL
LABORATORY COMMENT REPORT: NORMAL
P AXIS: 58 DEGREES
P OFFSET: 184 MS
P ONSET: 122 MS
PATH REPORT.FINAL DX SPEC: NORMAL
PATH REPORT.GROSS SPEC: NORMAL
PATH REPORT.RELEVANT HX SPEC: NORMAL
PATH REPORT.TOTAL CANCER: NORMAL
PR INTERVAL: 196 MS
Q ONSET: 220 MS
QRS COUNT: 13 BEATS
QRS DURATION: 112 MS
QT INTERVAL: 410 MS
QTC CALCULATION(BAZETT): 455 MS
QTC FREDERICIA: 440 MS
R AXIS: -32 DEGREES
RESIDENT REVIEW: NORMAL
T AXIS: 56 DEGREES
T OFFSET: 425 MS
VENTRICULAR RATE: 74 BPM

## 2024-08-05 ENCOUNTER — APPOINTMENT (OUTPATIENT)
Dept: OTOLARYNGOLOGY | Facility: CLINIC | Age: 64
End: 2024-08-05
Payer: MEDICAID

## 2024-08-05 VITALS
SYSTOLIC BLOOD PRESSURE: 114 MMHG | TEMPERATURE: 98.5 F | DIASTOLIC BLOOD PRESSURE: 82 MMHG | WEIGHT: 198.2 LBS | HEIGHT: 72 IN | BODY MASS INDEX: 26.84 KG/M2

## 2024-08-05 DIAGNOSIS — K11.8 PAROTID MASS: Primary | ICD-10-CM

## 2024-08-05 PROCEDURE — 1036F TOBACCO NON-USER: CPT | Performed by: OTOLARYNGOLOGY

## 2024-08-05 PROCEDURE — 3008F BODY MASS INDEX DOCD: CPT | Performed by: OTOLARYNGOLOGY

## 2024-08-05 PROCEDURE — 99024 POSTOP FOLLOW-UP VISIT: CPT | Performed by: OTOLARYNGOLOGY

## 2024-08-05 NOTE — PROGRESS NOTES
ENT Outpatient Consultation    Chief Complaint: Left parotid mass  History Of Present Illness  Ariel Pink is a 64 y.o. male presents for evaluation of a left-sided parotid mass.  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  He has had follow-up PET scans that have showed this mass and he is now interested in removal.  He occasionally has some irritation.  He has not had a recent CT scan    8/5/24: Patient returns for follow-up.  Underwent parotidectomy showing Warthin's tumor     Past Medical History  He has a past medical history of Aortic aneurysm (CMS-HCC), Chronic kidney disease, Colon polyp, Diffuse large B cell lymphoma (Multi), Hypertension, Parotid mass, Pulmonary embolism (Multi), and Rheumatic fever.  Lymphoma  Surgical History  He has a past surgical history that includes CT guided percutaneous biopsy LYMPH node superficial (03/29/2023); Colonoscopy; Spine surgery; Hernia repair; and IR tunneled central port placement (N/A).     Social History  He reports that he has quit smoking. His smoking use included cigarettes. He started smoking about 49 years ago. He has a 70.5 pack-year smoking history. He has never used smokeless tobacco. He reports that he does not drink alcohol and does not use drugs.    Family History  Family History   Problem Relation Name Age of Onset    Cancer Mother          Allergies  Patient has no known allergies.     Physical Exam:  Left face and neck incision well-approximated and healing well.  No sign of fluid collection     Last Recorded Vitals  Blood pressure 114/82, temperature 36.9 °C (98.5 °F), height 1.829 m (6'), weight 89.9 kg (198 lb 3.2 oz).    Relevant Results  Colonoscopy Diagnostic    Result Date: 6/19/2024  Table formatting from the original result was not included. Impression One 20 mm polyp in the rectum; removed by EMR, post-procedure bleeding was visualized; placed 1 clip successfully;  hemostasis achieved The ileocecal valve, cecum, ascending colon, hepatic flexure, transverse colon, splenic flexure, descending colon and sigmoid colon appeared normal. Findings One 20 mm sessile polyp in the rectum; completely removed target lesion en bloc by EMR and retrieved specimen. Clear-cut demarcation of the lesion was performed prior to procedure. EMR was performed with a hot snare. Post-procedure bleeding was visualized; placed 1 clip successfully (clip is MRI conditional); hemostasis achieved The ileocecal valve, cecum, ascending colon, hepatic flexure, transverse colon, splenic flexure, descending colon and sigmoid colon appeared normal.;  Recommendation  Await pathology results  Repeat colonoscopy in 5 years Indication Abnormal CT scan, Diffuse large B-cell lymphoma of extranodal site (Multi), Abnormal positron emission tomography (PET) scan Staff Staff Role Colin Bonds MD Proceduralist Medications See Anesthesia Record. Preprocedure A history and physical has been performed, and patient medication allergies have been reviewed. The patient's tolerance of previous anesthesia has been reviewed. The risks and benefits of the procedure and the sedation options and risks were discussed with the patient. All questions were answered and informed consent obtained. Details of the Procedure The patient underwent monitored anesthesia care, which was administered by an anesthesia professional. The patient's blood pressure, ECG, ETCO2, heart rate, level of consciousness, oxygen and respirations were monitored throughout the procedure. A digital rectal exam was performed. A perianal exam was performed. The scope was introduced through the anus and advanced to the cecum. The quality of bowel preparation was evaluated using the [place] Bowel Preparation Scale with scores of: right colon = 2, transverse colon = 3, left colon = 2. The total BBPS score was 7. Bowel prep was adequate. The patient's estimated blood loss  was minimal (<5 mL). The procedure was not difficult. The patient tolerated the procedure well. There were no apparent adverse events. Events Procedure Events Event Event Time ENDO SCOPE IN TIME 6/19/2024 11:48 AM ENDO SCOPE OUT TIME 6/19/2024 11:51 AM ENDO SCOPE IN TIME 6/19/2024 11:52 AM ENDO SCOPE IN TIME 6/19/2024 11:53 AM ENDO CECUM REACHED 6/19/2024 12:18 PM ENDO SCOPE OUT TIME 6/19/2024 12:21 PM Specimens ID Type Source Tests Collected by Time 1 : POLYP Tissue RECTUM ENDOSCOPIC MUCOSAL RESECTION SURGICAL PATHOLOGY EXAM [facility] 6/19/2024 1209 2 : POST POLYPECTOMY EDGE BX Tissue RECTUM BIOPSY SURGICAL PATHOLOGY EXAM [facility] 6/19/2024 1210 Procedure Location [facility] [address] [facility] [address] [phone] Referring Provider Shelley Jay MD Procedure Provider MD Shelley Acosta     Endoscopic Ultrasound (Lower)    Result Date: 6/19/2024  Table formatting from the original result was not included. Impression One 20 mm polyp in the distal rectum Localized wall thickening in the rectum, involving mucosa Findings One 20 mm sessile polyp in the distal rectum Localized wall thickening measuring 15 mm in the rectum 10 cm from the anal verge, involving mucosa. There was 15mm x 10 mm hypoechoic lesion arising from mucosa and limited to mucosa, was noted in rectum,  10 cm from anal verge. Recommendation  Proceed to colonoscopy  Indication Lymphoma (Multi) Staff Staff Role Colin Bonds MD Proceduralist Medications See Anesthesia Record. Preprocedure A history and physical has been performed, and patient medication allergies have been reviewed. The patient's tolerance of previous anesthesia has been reviewed. The risks and benefits of the procedure and the sedation options and risks were discussed with the patient. All questions were answered and informed consent obtained. Details of the Procedure The patient underwent monitored anesthesia care,  which was administered by an anesthesia professional. The patient's blood pressure, heart rate, level of consciousness, oxygen, respirations, ECG and ETCO2 were monitored throughout the procedure. A digital rectal exam was performed. The scope was advanced to the rectosigmoid. Retroflexion was performed in the rectum. The patient's estimated blood loss was minimal (<5 mL). The procedure was not difficult. The patient tolerated the procedure well. There were no apparent adverse events. Events Procedure Events Event Event Time ENDO SCOPE IN TIME 6/19/2024 11:48 AM ENDO SCOPE OUT TIME 6/19/2024 11:51 AM ENDO SCOPE IN TIME 6/19/2024 11:52 AM ENDO SCOPE IN TIME 6/19/2024 11:53 AM ENDO CECUM REACHED 6/19/2024 12:18 PM ENDO SCOPE OUT TIME 6/19/2024 12:21 PM Specimens ID Type Source Tests Collected by Time 1 : POLYP Tissue RECTUM ENDOSCOPIC MUCOSAL RESECTION SURGICAL PATHOLOGY EXAM [facility] 6/19/2024 1209 2 : POST POLYPECTOMY EDGE BX Tissue RECTUM BIOPSY SURGICAL PATHOLOGY EXAM [facility] 6/19/2024 1210 Procedure Location [facility] [address] [facility] [id number] [phone] Referring Provider Shelley Jay MD Procedure Provider Colin Bonds MD       Assessment and Plan  64 y.o. male with Warthin's tumor of the left parotid status post parotidectomy    -Currently healing well.  He will follow-up with me as needed    Alexi Donis MD

## (undated) DEVICE — SHEARS, CURVED 9CM HARMONIC FOCUS

## (undated) DEVICE — TIP, SUCTION, YANKAUER, FLEXIBLE

## (undated) DEVICE — BLADE, ULTRA CLEAN, BOVIE MOD TIP, 1IN

## (undated) DEVICE — NEEDLE, ELECTRODE, ELECTROSURGICAL, INSULATED

## (undated) DEVICE — SOLUTION, IRRIGATION, STERILE WATER, 1000 ML, POUR BOTTLE

## (undated) DEVICE — SOLUTION, IRRIGATION, SODIUM CHLORIDE 0.9%, 1000 ML, POUR BOTTLE

## (undated) DEVICE — ELECTRODE, PAIRED SUBDERMAL OTO

## (undated) DEVICE — Device

## (undated) DEVICE — GLOVE, SURGICAL, PROTEXIS PI , 7.5, PF, LF

## (undated) DEVICE — SUTURE, VICRYL, 3-0,18 IN, SH, UNDYED

## (undated) DEVICE — STAPLER, SKIN, PLUS, WIDE, 35

## (undated) DEVICE — SPONGE, DISSECTOR, PEANUT, 3/8, STERILE 5 FOAM HOLDER"

## (undated) DEVICE — STAY SET, SURGICAL , 5MM SHARP HOOK, CS/ 50

## (undated) DEVICE — TOWEL PACK, STERILE, 4/PACK, BLUE

## (undated) DEVICE — CORD, CAUTERY, BIOPOLAR FORCEP, 12FT

## (undated) DEVICE — SPONGE, HEMOSTAT, SURGICEL FIBRILLAR, ABS, 4 X 4, LF

## (undated) DEVICE — PROBE, STIMULATOR, MONOPOLAR, FLUSH TIP, PRASS, W/HANDLE, STANDARD

## (undated) DEVICE — PREP TRAY, VAGINAL

## (undated) DEVICE — SUTURE, MONOCRYL, 4-0, 27 IN, PS-2, UNDYED